# Patient Record
Sex: MALE | Race: WHITE | NOT HISPANIC OR LATINO | URBAN - METROPOLITAN AREA
[De-identification: names, ages, dates, MRNs, and addresses within clinical notes are randomized per-mention and may not be internally consistent; named-entity substitution may affect disease eponyms.]

---

## 2017-03-06 ENCOUNTER — OFFICE VISIT - HEALTHEAST (OUTPATIENT)
Dept: INTERNAL MEDICINE | Facility: CLINIC | Age: 82
End: 2017-03-06

## 2017-03-06 DIAGNOSIS — R27.0 ATAXIA: ICD-10-CM

## 2017-03-06 DIAGNOSIS — E11.9 DIABETES MELLITUS TYPE 2 IN NONOBESE (H): ICD-10-CM

## 2017-03-06 DIAGNOSIS — I10 ESSENTIAL HYPERTENSION WITH GOAL BLOOD PRESSURE LESS THAN 140/90: ICD-10-CM

## 2017-03-06 DIAGNOSIS — C61 PROSTATE CANCER (H): ICD-10-CM

## 2017-03-06 LAB — HBA1C MFR BLD: 5.2 % (ref 3.5–6)

## 2017-03-06 ASSESSMENT — MIFFLIN-ST. JEOR: SCORE: 1237.78

## 2017-03-07 ENCOUNTER — COMMUNICATION - HEALTHEAST (OUTPATIENT)
Dept: INTERNAL MEDICINE | Facility: CLINIC | Age: 82
End: 2017-03-07

## 2017-08-22 ENCOUNTER — COMMUNICATION - HEALTHEAST (OUTPATIENT)
Dept: INTERNAL MEDICINE | Facility: CLINIC | Age: 82
End: 2017-08-22

## 2017-08-22 DIAGNOSIS — I10 ESSENTIAL HYPERTENSION: ICD-10-CM

## 2017-08-24 ENCOUNTER — COMMUNICATION - HEALTHEAST (OUTPATIENT)
Dept: INTERNAL MEDICINE | Facility: CLINIC | Age: 82
End: 2017-08-24

## 2017-08-24 DIAGNOSIS — I10 ESSENTIAL HYPERTENSION: ICD-10-CM

## 2017-08-31 ENCOUNTER — RECORDS - HEALTHEAST (OUTPATIENT)
Dept: ADMINISTRATIVE | Facility: OTHER | Age: 82
End: 2017-08-31

## 2017-09-11 ENCOUNTER — OFFICE VISIT - HEALTHEAST (OUTPATIENT)
Dept: INTERNAL MEDICINE | Facility: CLINIC | Age: 82
End: 2017-09-11

## 2017-09-11 DIAGNOSIS — C61 PROSTATE CANCER (H): ICD-10-CM

## 2017-09-11 DIAGNOSIS — N18.2 CKD (CHRONIC KIDNEY DISEASE) STAGE 2, GFR 60-89 ML/MIN: ICD-10-CM

## 2017-09-11 DIAGNOSIS — Z23 INFLUENZA VACCINATION ADMINISTERED AT CURRENT VISIT: ICD-10-CM

## 2017-09-11 DIAGNOSIS — R27.0 ATAXIA: ICD-10-CM

## 2017-09-11 DIAGNOSIS — R97.20 PSA ELEVATION: ICD-10-CM

## 2017-09-11 DIAGNOSIS — I10 ESSENTIAL HYPERTENSION: ICD-10-CM

## 2017-09-11 LAB — PSA SERPL-MCNC: 34.8 NG/ML (ref 0–6.5)

## 2017-09-11 ASSESSMENT — MIFFLIN-ST. JEOR: SCORE: 1255.93

## 2017-09-12 ENCOUNTER — COMMUNICATION - HEALTHEAST (OUTPATIENT)
Dept: INTERNAL MEDICINE | Facility: CLINIC | Age: 82
End: 2017-09-12

## 2017-12-13 ENCOUNTER — AMBULATORY - HEALTHEAST (OUTPATIENT)
Dept: LAB | Facility: CLINIC | Age: 82
End: 2017-12-13

## 2017-12-13 DIAGNOSIS — C61 PROSTATE CANCER (H): ICD-10-CM

## 2017-12-13 LAB — PSA SERPL-MCNC: 39.5 NG/ML (ref 0–6.5)

## 2017-12-15 ENCOUNTER — COMMUNICATION - HEALTHEAST (OUTPATIENT)
Dept: INTERNAL MEDICINE | Facility: CLINIC | Age: 82
End: 2017-12-15

## 2018-06-05 ENCOUNTER — COMMUNICATION - HEALTHEAST (OUTPATIENT)
Dept: INTERNAL MEDICINE | Facility: CLINIC | Age: 83
End: 2018-06-05

## 2018-06-07 ENCOUNTER — COMMUNICATION - HEALTHEAST (OUTPATIENT)
Dept: INTERNAL MEDICINE | Facility: CLINIC | Age: 83
End: 2018-06-07

## 2018-06-08 ENCOUNTER — OFFICE VISIT - HEALTHEAST (OUTPATIENT)
Dept: INTERNAL MEDICINE | Facility: CLINIC | Age: 83
End: 2018-06-08

## 2018-06-08 DIAGNOSIS — F10.10 ALCOHOL ABUSE: ICD-10-CM

## 2018-06-08 DIAGNOSIS — E11.9 DIABETES MELLITUS TYPE 2 IN NONOBESE (H): ICD-10-CM

## 2018-06-08 DIAGNOSIS — C61 PROSTATE CANCER (H): ICD-10-CM

## 2018-06-08 DIAGNOSIS — Z01.818 PREOPERATIVE EXAMINATION: ICD-10-CM

## 2018-06-08 DIAGNOSIS — R00.1 CHRONIC SINUS BRADYCARDIA: ICD-10-CM

## 2018-06-08 DIAGNOSIS — I25.10 CORONARY STENOSIS: ICD-10-CM

## 2018-06-08 DIAGNOSIS — H26.9 CATARACT OF BOTH EYES, UNSPECIFIED CATARACT TYPE: ICD-10-CM

## 2018-06-08 DIAGNOSIS — I10 ESSENTIAL HYPERTENSION: ICD-10-CM

## 2018-06-08 ASSESSMENT — MIFFLIN-ST. JEOR: SCORE: 1187.89

## 2018-06-11 LAB
CREAT SERPL-MCNC: 2.9 MG/DL (ref 0.7–1.3)
GFR SERPL CREATININE-BSD FRML MDRD: 21 ML/MIN/1.73M2
POTASSIUM BLD-SCNC: 4.5 MMOL/L (ref 3.5–5)

## 2018-06-13 ENCOUNTER — COMMUNICATION - HEALTHEAST (OUTPATIENT)
Dept: INTERNAL MEDICINE | Facility: CLINIC | Age: 83
End: 2018-06-13

## 2018-06-15 ENCOUNTER — COMMUNICATION - HEALTHEAST (OUTPATIENT)
Dept: INTERNAL MEDICINE | Facility: CLINIC | Age: 83
End: 2018-06-15

## 2018-08-08 ENCOUNTER — OFFICE VISIT - HEALTHEAST (OUTPATIENT)
Dept: INTERNAL MEDICINE | Facility: CLINIC | Age: 83
End: 2018-08-08

## 2018-08-08 DIAGNOSIS — E11.9 DIABETES MELLITUS TYPE 2 IN NONOBESE (H): ICD-10-CM

## 2018-08-08 DIAGNOSIS — R97.20 ELEVATED PROSTATE SPECIFIC ANTIGEN (PSA): ICD-10-CM

## 2018-08-08 DIAGNOSIS — C61 PROSTATE CANCER (H): ICD-10-CM

## 2018-08-08 DIAGNOSIS — I10 ESSENTIAL HYPERTENSION: ICD-10-CM

## 2018-08-08 LAB
ALBUMIN SERPL-MCNC: 4 G/DL (ref 3.5–5)
ANION GAP SERPL CALCULATED.3IONS-SCNC: 13 MMOL/L (ref 5–18)
BASOPHILS # BLD AUTO: 0.1 THOU/UL (ref 0–0.2)
BASOPHILS NFR BLD AUTO: 1 % (ref 0–2)
BUN SERPL-MCNC: 43 MG/DL (ref 8–28)
CALCIUM SERPL-MCNC: 9.5 MG/DL (ref 8.5–10.5)
CHLORIDE BLD-SCNC: 110 MMOL/L (ref 98–107)
CO2 SERPL-SCNC: 18 MMOL/L (ref 22–31)
CREAT SERPL-MCNC: 2 MG/DL (ref 0.7–1.3)
EOSINOPHIL # BLD AUTO: 0.3 THOU/UL (ref 0–0.4)
EOSINOPHIL NFR BLD AUTO: 3 % (ref 0–6)
ERYTHROCYTE [DISTWIDTH] IN BLOOD BY AUTOMATED COUNT: 11.9 % (ref 11–14.5)
GFR SERPL CREATININE-BSD FRML MDRD: 32 ML/MIN/1.73M2
GLUCOSE BLD-MCNC: 113 MG/DL (ref 70–125)
HBA1C MFR BLD: 5.7 % (ref 3.5–6)
HCT VFR BLD AUTO: 34 % (ref 40–54)
HGB BLD-MCNC: 11.7 G/DL (ref 14–18)
LYMPHOCYTES # BLD AUTO: 1.7 THOU/UL (ref 0.8–4.4)
LYMPHOCYTES NFR BLD AUTO: 21 % (ref 20–40)
MCH RBC QN AUTO: 33 PG (ref 27–34)
MCHC RBC AUTO-ENTMCNC: 34.4 G/DL (ref 32–36)
MCV RBC AUTO: 96 FL (ref 80–100)
MONOCYTES # BLD AUTO: 1 THOU/UL (ref 0–0.9)
MONOCYTES NFR BLD AUTO: 12 % (ref 2–10)
NEUTROPHILS # BLD AUTO: 5.2 THOU/UL (ref 2–7.7)
NEUTROPHILS NFR BLD AUTO: 63 % (ref 50–70)
PHOSPHATE SERPL-MCNC: 4.2 MG/DL (ref 2.5–4.5)
PLATELET # BLD AUTO: 197 THOU/UL (ref 140–440)
PMV BLD AUTO: 10.5 FL (ref 8.5–12.5)
POTASSIUM BLD-SCNC: 4.8 MMOL/L (ref 3.5–5)
PSA SERPL-MCNC: 38.5 NG/ML (ref 0–6.5)
RBC # BLD AUTO: 3.55 MILL/UL (ref 4.4–6.2)
SODIUM SERPL-SCNC: 141 MMOL/L (ref 136–145)
WBC: 8.2 THOU/UL (ref 4–11)

## 2018-08-08 ASSESSMENT — MIFFLIN-ST. JEOR: SCORE: 1237.78

## 2018-08-09 ENCOUNTER — COMMUNICATION - HEALTHEAST (OUTPATIENT)
Dept: INTERNAL MEDICINE | Facility: CLINIC | Age: 83
End: 2018-08-09

## 2018-09-13 ENCOUNTER — COMMUNICATION - HEALTHEAST (OUTPATIENT)
Dept: INTERNAL MEDICINE | Facility: CLINIC | Age: 83
End: 2018-09-13

## 2018-09-13 DIAGNOSIS — I10 ESSENTIAL HYPERTENSION: ICD-10-CM

## 2018-12-04 ENCOUNTER — COMMUNICATION - HEALTHEAST (OUTPATIENT)
Dept: INTERNAL MEDICINE | Facility: CLINIC | Age: 83
End: 2018-12-04

## 2018-12-04 DIAGNOSIS — I10 ESSENTIAL HYPERTENSION: ICD-10-CM

## 2018-12-07 ENCOUNTER — COMMUNICATION - HEALTHEAST (OUTPATIENT)
Dept: INTERNAL MEDICINE | Facility: CLINIC | Age: 83
End: 2018-12-07

## 2018-12-07 DIAGNOSIS — I10 ESSENTIAL HYPERTENSION: ICD-10-CM

## 2019-01-01 ENCOUNTER — AMBULATORY - HEALTHEAST (OUTPATIENT)
Dept: LAB | Facility: CLINIC | Age: 84
End: 2019-01-01

## 2019-01-01 ENCOUNTER — OFFICE VISIT - HEALTHEAST (OUTPATIENT)
Dept: INTERNAL MEDICINE | Facility: CLINIC | Age: 84
End: 2019-01-01

## 2019-01-01 ENCOUNTER — HOME CARE/HOSPICE - HEALTHEAST (OUTPATIENT)
Dept: HOME HEALTH SERVICES | Facility: HOME HEALTH | Age: 84
End: 2019-01-01

## 2019-01-01 ENCOUNTER — RECORDS - HEALTHEAST (OUTPATIENT)
Dept: INTERNAL MEDICINE | Facility: CLINIC | Age: 84
End: 2019-01-01

## 2019-01-01 ENCOUNTER — COMMUNICATION - HEALTHEAST (OUTPATIENT)
Dept: INTERNAL MEDICINE | Facility: CLINIC | Age: 84
End: 2019-01-01

## 2019-01-01 ENCOUNTER — AMBULATORY - HEALTHEAST (OUTPATIENT)
Dept: INTERNAL MEDICINE | Facility: CLINIC | Age: 84
End: 2019-01-01

## 2019-01-01 DIAGNOSIS — N18.30 CKD (CHRONIC KIDNEY DISEASE) STAGE 3, GFR 30-59 ML/MIN (H): ICD-10-CM

## 2019-01-01 DIAGNOSIS — I10 ESSENTIAL HYPERTENSION: ICD-10-CM

## 2019-01-01 DIAGNOSIS — Z23 FLU VACCINE NEED: ICD-10-CM

## 2019-01-01 DIAGNOSIS — N18.30 CHRONIC KIDNEY DISEASE, STAGE III (MODERATE) (H): ICD-10-CM

## 2019-01-01 DIAGNOSIS — C61 PROSTATE CANCER (H): ICD-10-CM

## 2019-01-01 DIAGNOSIS — N17.9 AKI (ACUTE KIDNEY INJURY) (H): ICD-10-CM

## 2019-01-01 DIAGNOSIS — R60.9 EDEMA, UNSPECIFIED TYPE: ICD-10-CM

## 2019-01-01 DIAGNOSIS — Z86.718 HISTORY OF DVT (DEEP VEIN THROMBOSIS): ICD-10-CM

## 2019-01-01 LAB
ALBUMIN SERPL-MCNC: 3.9 G/DL (ref 3.5–5)
ALBUMIN SERPL-MCNC: 4.4 G/DL (ref 3.5–5)
ANION GAP SERPL CALCULATED.3IONS-SCNC: 10 MMOL/L (ref 5–18)
ANION GAP SERPL CALCULATED.3IONS-SCNC: 14 MMOL/L (ref 5–18)
BUN SERPL-MCNC: 46 MG/DL (ref 8–28)
BUN SERPL-MCNC: 52 MG/DL (ref 8–28)
CALCIUM SERPL-MCNC: 9.3 MG/DL (ref 8.5–10.5)
CALCIUM SERPL-MCNC: 9.4 MG/DL (ref 8.5–10.5)
CHLORIDE BLD-SCNC: 109 MMOL/L (ref 98–107)
CHLORIDE BLD-SCNC: 110 MMOL/L (ref 98–107)
CO2 SERPL-SCNC: 17 MMOL/L (ref 22–31)
CO2 SERPL-SCNC: 20 MMOL/L (ref 22–31)
CREAT SERPL-MCNC: 1.92 MG/DL (ref 0.7–1.3)
CREAT SERPL-MCNC: 2.15 MG/DL (ref 0.7–1.3)
GFR SERPL CREATININE-BSD FRML MDRD: 29 ML/MIN/1.73M2
GFR SERPL CREATININE-BSD FRML MDRD: 33 ML/MIN/1.73M2
GLUCOSE BLD-MCNC: 101 MG/DL (ref 70–125)
GLUCOSE BLD-MCNC: 84 MG/DL (ref 70–125)
PHOSPHATE SERPL-MCNC: 3.4 MG/DL (ref 2.5–4.5)
PHOSPHATE SERPL-MCNC: 4.1 MG/DL (ref 2.5–4.5)
POTASSIUM BLD-SCNC: 4.5 MMOL/L (ref 3.5–5)
POTASSIUM BLD-SCNC: 4.9 MMOL/L (ref 3.5–5)
PSA SERPL-MCNC: 49.1 NG/ML (ref 0–6.5)
SODIUM SERPL-SCNC: 140 MMOL/L (ref 136–145)
SODIUM SERPL-SCNC: 140 MMOL/L (ref 136–145)

## 2019-01-01 ASSESSMENT — MIFFLIN-ST. JEOR
SCORE: 1226.99
SCORE: 1224.54
SCORE: 1242.5

## 2019-03-01 ENCOUNTER — COMMUNICATION - HEALTHEAST (OUTPATIENT)
Dept: INTERNAL MEDICINE | Facility: CLINIC | Age: 84
End: 2019-03-01

## 2019-03-01 DIAGNOSIS — I10 ESSENTIAL HYPERTENSION: ICD-10-CM

## 2019-03-29 ENCOUNTER — HOME CARE/HOSPICE - HEALTHEAST (OUTPATIENT)
Dept: HOME HEALTH SERVICES | Facility: HOME HEALTH | Age: 84
End: 2019-03-29

## 2019-03-30 ENCOUNTER — COMMUNICATION - HEALTHEAST (OUTPATIENT)
Dept: HOME HEALTH SERVICES | Facility: HOME HEALTH | Age: 84
End: 2019-03-30

## 2019-04-01 ENCOUNTER — COMMUNICATION - HEALTHEAST (OUTPATIENT)
Dept: CARE COORDINATION | Facility: CLINIC | Age: 84
End: 2019-04-01

## 2019-04-01 ENCOUNTER — COMMUNICATION - HEALTHEAST (OUTPATIENT)
Dept: INTERNAL MEDICINE | Facility: CLINIC | Age: 84
End: 2019-04-01

## 2019-04-03 ENCOUNTER — AMBULATORY - HEALTHEAST (OUTPATIENT)
Dept: OTHER | Facility: CLINIC | Age: 84
End: 2019-04-03

## 2019-04-05 ENCOUNTER — AMBULATORY - HEALTHEAST (OUTPATIENT)
Dept: INTERNAL MEDICINE | Facility: CLINIC | Age: 84
End: 2019-04-05

## 2019-07-03 ENCOUNTER — OFFICE VISIT - HEALTHEAST (OUTPATIENT)
Dept: INTERNAL MEDICINE | Facility: CLINIC | Age: 84
End: 2019-07-03

## 2019-07-03 DIAGNOSIS — E88.09 HYPOALBUMINEMIA: ICD-10-CM

## 2019-07-03 DIAGNOSIS — R25.2 LEG CRAMPS: ICD-10-CM

## 2019-07-03 DIAGNOSIS — C61 PROSTATE CANCER (H): ICD-10-CM

## 2019-07-03 DIAGNOSIS — I10 ESSENTIAL HYPERTENSION: ICD-10-CM

## 2019-07-03 DIAGNOSIS — D64.9 ANEMIA, UNSPECIFIED TYPE: ICD-10-CM

## 2019-07-03 DIAGNOSIS — N18.9 CHRONIC KIDNEY DISEASE, UNSPECIFIED CKD STAGE: ICD-10-CM

## 2019-07-03 DIAGNOSIS — R97.20 ELEVATED PROSTATE SPECIFIC ANTIGEN (PSA): ICD-10-CM

## 2019-07-03 DIAGNOSIS — R42 DIZZINESS: ICD-10-CM

## 2019-07-03 DIAGNOSIS — R64 CACHEXIA (H): ICD-10-CM

## 2019-07-03 LAB
ALBUMIN SERPL-MCNC: 4.4 G/DL (ref 3.5–5)
ALBUMIN SERPL-MCNC: 4.4 G/DL (ref 3.5–5)
ALP SERPL-CCNC: 90 U/L (ref 45–120)
ALT SERPL W P-5'-P-CCNC: 13 U/L (ref 0–45)
ANION GAP SERPL CALCULATED.3IONS-SCNC: 13 MMOL/L (ref 5–18)
AST SERPL W P-5'-P-CCNC: 19 U/L (ref 0–40)
BASOPHILS # BLD AUTO: 0 THOU/UL (ref 0–0.2)
BASOPHILS NFR BLD AUTO: 0 % (ref 0–2)
BILIRUB DIRECT SERPL-MCNC: 0.1 MG/DL
BILIRUB SERPL-MCNC: 0.3 MG/DL (ref 0–1)
BUN SERPL-MCNC: 60 MG/DL (ref 8–28)
CALCIUM SERPL-MCNC: 10.5 MG/DL (ref 8.5–10.5)
CHLORIDE BLD-SCNC: 109 MMOL/L (ref 98–107)
CO2 SERPL-SCNC: 19 MMOL/L (ref 22–31)
CREAT SERPL-MCNC: 2.35 MG/DL (ref 0.7–1.3)
EOSINOPHIL # BLD AUTO: 0.3 THOU/UL (ref 0–0.4)
EOSINOPHIL NFR BLD AUTO: 3 % (ref 0–6)
ERYTHROCYTE [DISTWIDTH] IN BLOOD BY AUTOMATED COUNT: 12.4 % (ref 11–14.5)
ERYTHROCYTE [SEDIMENTATION RATE] IN BLOOD BY WESTERGREN METHOD: 19 MM/HR (ref 0–15)
GFR SERPL CREATININE-BSD FRML MDRD: 26 ML/MIN/1.73M2
GLUCOSE BLD-MCNC: 105 MG/DL (ref 70–125)
HCT VFR BLD AUTO: 37.1 % (ref 40–54)
HGB BLD-MCNC: 12.4 G/DL (ref 14–18)
LYMPHOCYTES # BLD AUTO: 1.9 THOU/UL (ref 0.8–4.4)
LYMPHOCYTES NFR BLD AUTO: 19 % (ref 20–40)
MCH RBC QN AUTO: 30.6 PG (ref 27–34)
MCHC RBC AUTO-ENTMCNC: 33.5 G/DL (ref 32–36)
MCV RBC AUTO: 91 FL (ref 80–100)
MONOCYTES # BLD AUTO: 0.9 THOU/UL (ref 0–0.9)
MONOCYTES NFR BLD AUTO: 9 % (ref 2–10)
NEUTROPHILS # BLD AUTO: 7.1 THOU/UL (ref 2–7.7)
NEUTROPHILS NFR BLD AUTO: 69 % (ref 50–70)
PHOSPHATE SERPL-MCNC: 3.8 MG/DL (ref 2.5–4.5)
PLATELET # BLD AUTO: 230 THOU/UL (ref 140–440)
PMV BLD AUTO: 8.3 FL (ref 7–10)
POTASSIUM BLD-SCNC: 5.7 MMOL/L (ref 3.5–5)
PROT SERPL-MCNC: 7.6 G/DL (ref 6–8)
PSA SERPL-MCNC: 47.4 NG/ML (ref 0–6.5)
RBC # BLD AUTO: 4.06 MILL/UL (ref 4.4–6.2)
SODIUM SERPL-SCNC: 141 MMOL/L (ref 136–145)
WBC: 10.2 THOU/UL (ref 4–11)

## 2019-07-03 ASSESSMENT — MIFFLIN-ST. JEOR: SCORE: 1217.38

## 2019-07-05 LAB
ALBUMIN PERCENT: 64.4 % (ref 51–67)
ALBUMIN SERPL ELPH-MCNC: 4.6 G/DL (ref 3.2–4.7)
ALPHA 1 PERCENT: 2.9 % (ref 2–4)
ALPHA 2 PERCENT: 12.2 % (ref 5–13)
ALPHA1 GLOB SERPL ELPH-MCNC: 0.2 G/DL (ref 0.1–0.3)
ALPHA2 GLOB SERPL ELPH-MCNC: 0.9 G/DL (ref 0.4–0.9)
B-GLOBULIN SERPL ELPH-MCNC: 0.8 G/DL (ref 0.7–1.2)
BETA PERCENT: 10.6 % (ref 10–17)
GAMMA GLOB SERPL ELPH-MCNC: 0.7 G/DL (ref 0.6–1.4)
GAMMA GLOBULIN PERCENT: 9.9 % (ref 9–20)
PATH ICD:: NORMAL
PROT PATTERN SERPL ELPH-IMP: NORMAL
PROT SERPL-MCNC: 7.1 G/DL (ref 6–8)
REVIEWING PATHOLOGIST: NORMAL

## 2019-07-08 ENCOUNTER — AMBULATORY - HEALTHEAST (OUTPATIENT)
Dept: INTERNAL MEDICINE | Facility: CLINIC | Age: 84
End: 2019-07-08

## 2019-07-08 DIAGNOSIS — N17.9 AKI (ACUTE KIDNEY INJURY) (H): ICD-10-CM

## 2019-07-09 ENCOUNTER — COMMUNICATION - HEALTHEAST (OUTPATIENT)
Dept: INTERNAL MEDICINE | Facility: CLINIC | Age: 84
End: 2019-07-09

## 2020-01-01 ENCOUNTER — COMMUNICATION - HEALTHEAST (OUTPATIENT)
Dept: INTERNAL MEDICINE | Facility: CLINIC | Age: 85
End: 2020-01-01

## 2020-01-01 ENCOUNTER — HOME CARE/HOSPICE - HEALTHEAST (OUTPATIENT)
Dept: HOSPICE | Facility: HOSPICE | Age: 85
End: 2020-01-01

## 2020-01-01 ENCOUNTER — OFFICE VISIT - HEALTHEAST (OUTPATIENT)
Dept: GERIATRICS | Facility: CLINIC | Age: 85
End: 2020-01-01

## 2020-01-01 ENCOUNTER — AMBULATORY - HEALTHEAST (OUTPATIENT)
Dept: GERIATRICS | Facility: CLINIC | Age: 85
End: 2020-01-01

## 2020-01-01 ENCOUNTER — HOSPITAL ENCOUNTER (OUTPATIENT)
Dept: RADIOLOGY | Facility: CLINIC | Age: 85
Discharge: HOME | End: 2020-06-25

## 2020-01-01 ENCOUNTER — AMBULATORY - HEALTHEAST (OUTPATIENT)
Dept: PHARMACY | Facility: CLINIC | Age: 85
End: 2020-01-01

## 2020-01-01 ENCOUNTER — RECORDS - HEALTHEAST (OUTPATIENT)
Dept: LAB | Facility: CLINIC | Age: 85
End: 2020-01-01

## 2020-01-01 ENCOUNTER — OFFICE VISIT - HEALTHEAST (OUTPATIENT)
Dept: INTERNAL MEDICINE | Facility: CLINIC | Age: 85
End: 2020-01-01

## 2020-01-01 ENCOUNTER — DOCUMENTATION ONLY (OUTPATIENT)
Dept: OTHER | Facility: CLINIC | Age: 85
End: 2020-01-01

## 2020-01-01 ENCOUNTER — ANESTHESIA - HEALTHEAST (OUTPATIENT)
Dept: SURGERY | Facility: CLINIC | Age: 85
End: 2020-01-01

## 2020-01-01 ENCOUNTER — AMBULATORY - HEALTHEAST (OUTPATIENT)
Dept: SURGERY | Facility: CLINIC | Age: 85
End: 2020-01-01

## 2020-01-01 ENCOUNTER — HOSPITAL ENCOUNTER (OUTPATIENT)
Dept: RADIOLOGY | Facility: CLINIC | Age: 85
Discharge: HOME | End: 2020-06-17

## 2020-01-01 ENCOUNTER — SURGERY - HEALTHEAST (OUTPATIENT)
Dept: SURGERY | Facility: CLINIC | Age: 85
End: 2020-01-01

## 2020-01-01 ENCOUNTER — RECORDS - HEALTHEAST (OUTPATIENT)
Dept: ADMINISTRATIVE | Facility: OTHER | Age: 85
End: 2020-01-01

## 2020-01-01 ENCOUNTER — COMMUNICATION - HEALTHEAST (OUTPATIENT)
Dept: NURSING | Facility: CLINIC | Age: 85
End: 2020-01-01

## 2020-01-01 ENCOUNTER — AMBULATORY - HEALTHEAST (OUTPATIENT)
Dept: NURSING | Facility: CLINIC | Age: 85
End: 2020-01-01

## 2020-01-01 ENCOUNTER — AMBULATORY - HEALTHEAST (OUTPATIENT)
Dept: OTHER | Facility: CLINIC | Age: 85
End: 2020-01-01

## 2020-01-01 ENCOUNTER — AMBULATORY - HEALTHEAST (OUTPATIENT)
Dept: HOSPICE | Facility: HOSPICE | Age: 85
End: 2020-01-01

## 2020-01-01 DIAGNOSIS — F43.10 PTSD (POST-TRAUMATIC STRESS DISORDER): ICD-10-CM

## 2020-01-01 DIAGNOSIS — N18.30 CHRONIC KIDNEY DISEASE, STAGE III (MODERATE) (H): ICD-10-CM

## 2020-01-01 DIAGNOSIS — N18.4 CKD (CHRONIC KIDNEY DISEASE) STAGE 4, GFR 15-29 ML/MIN (H): ICD-10-CM

## 2020-01-01 DIAGNOSIS — Z71.89 ADVANCED CARE PLANNING/COUNSELING DISCUSSION: ICD-10-CM

## 2020-01-01 DIAGNOSIS — R53.81 PHYSICAL DECONDITIONING: ICD-10-CM

## 2020-01-01 DIAGNOSIS — R13.19 OTHER DYSPHAGIA: ICD-10-CM

## 2020-01-01 DIAGNOSIS — R62.7 ADULT FAILURE TO THRIVE: ICD-10-CM

## 2020-01-01 DIAGNOSIS — R13.10 DYSPHAGIA: ICD-10-CM
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
## 2020-01-01 DIAGNOSIS — Z51.5 HOSPICE CARE: ICD-10-CM

## 2020-01-01 DIAGNOSIS — R46.89 COGNITIVE AND BEHAVIORAL CHANGES: ICD-10-CM

## 2020-01-01 DIAGNOSIS — R00.1 CHRONIC SINUS BRADYCARDIA: ICD-10-CM

## 2020-01-01 DIAGNOSIS — S72.142A CLOSED INTERTROCHANTERIC FRACTURE OF HIP, LEFT, INITIAL ENCOUNTER (H): ICD-10-CM

## 2020-01-01 DIAGNOSIS — E11.9 DIABETES MELLITUS TYPE 2 IN NONOBESE (H): ICD-10-CM

## 2020-01-01 DIAGNOSIS — I10 ESSENTIAL HYPERTENSION: ICD-10-CM

## 2020-01-01 DIAGNOSIS — S72.142A CLOSED COMMINUTED INTERTROCHANTERIC FRACTURE OF LEFT FEMUR, INITIAL ENCOUNTER (H): ICD-10-CM

## 2020-01-01 DIAGNOSIS — I82.4Y2 ACUTE DEEP VEIN THROMBOSIS (DVT) OF PROXIMAL VEIN OF LEFT LOWER EXTREMITY (H): ICD-10-CM

## 2020-01-01 DIAGNOSIS — F22 PARANOIA (H): ICD-10-CM

## 2020-01-01 DIAGNOSIS — R45.1 AGITATION: ICD-10-CM

## 2020-01-01 DIAGNOSIS — S72.002A HIP FRACTURE REQUIRING OPERATIVE REPAIR, LEFT, CLOSED, INITIAL ENCOUNTER (H): ICD-10-CM

## 2020-01-01 DIAGNOSIS — C61 PROSTATE CANCER (H): ICD-10-CM

## 2020-01-01 DIAGNOSIS — K22.89 PRESBYESOPHAGUS: ICD-10-CM

## 2020-01-01 DIAGNOSIS — R52 PAIN: ICD-10-CM

## 2020-01-01 DIAGNOSIS — I25.10 CORONARY STENOSIS: ICD-10-CM

## 2020-01-01 DIAGNOSIS — R41.89 COGNITIVE AND BEHAVIORAL CHANGES: ICD-10-CM

## 2020-01-01 DIAGNOSIS — F10.10 ALCOHOL ABUSE: ICD-10-CM

## 2020-01-01 DIAGNOSIS — R41.0 CONFUSION: ICD-10-CM

## 2020-01-01 DIAGNOSIS — N18.4 ANEMIA DUE TO STAGE 4 CHRONIC KIDNEY DISEASE (H): ICD-10-CM

## 2020-01-01 DIAGNOSIS — Z11.59 ENCOUNTER FOR SCREENING FOR OTHER VIRAL DISEASES: ICD-10-CM

## 2020-01-01 DIAGNOSIS — K21.00 GASTROESOPHAGEAL REFLUX DISEASE WITH ESOPHAGITIS: ICD-10-CM

## 2020-01-01 DIAGNOSIS — I73.9 PAD (PERIPHERAL ARTERY DISEASE) (H): ICD-10-CM

## 2020-01-01 DIAGNOSIS — F43.22 ADJUSTMENT DISORDER WITH ANXIOUS MOOD: ICD-10-CM

## 2020-01-01 DIAGNOSIS — E87.5 HYPERKALEMIA: ICD-10-CM

## 2020-01-01 DIAGNOSIS — R13.10 DYSPHAGIA, UNSPECIFIED TYPE: ICD-10-CM
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
## 2020-01-01 DIAGNOSIS — N17.9 AKI (ACUTE KIDNEY INJURY) (H): ICD-10-CM

## 2020-01-01 DIAGNOSIS — R47.02 DYSPHASIA: ICD-10-CM

## 2020-01-01 DIAGNOSIS — G93.40 ACUTE ENCEPHALOPATHY: ICD-10-CM

## 2020-01-01 DIAGNOSIS — D63.1 ANEMIA DUE TO STAGE 4 CHRONIC KIDNEY DISEASE (H): ICD-10-CM

## 2020-01-01 LAB
ALBUMIN SERPL-MCNC: 2.6 G/DL (ref 3.5–5)
ALBUMIN SERPL-MCNC: 4 G/DL (ref 3.5–5)
ALBUMIN UR-MCNC: ABNORMAL MG/DL
ALBUMIN UR-MCNC: ABNORMAL MG/DL
ALP SERPL-CCNC: 105 U/L (ref 45–120)
ALT SERPL W P-5'-P-CCNC: <9 U/L (ref 0–45)
ANION GAP SERPL CALCULATED.3IONS-SCNC: 10 MMOL/L (ref 5–18)
ANION GAP SERPL CALCULATED.3IONS-SCNC: 10 MMOL/L (ref 5–18)
ANION GAP SERPL CALCULATED.3IONS-SCNC: 11 MMOL/L (ref 5–18)
ANION GAP SERPL CALCULATED.3IONS-SCNC: 13 MMOL/L (ref 5–18)
APPEARANCE UR: CLEAR
APPEARANCE UR: CLEAR
AST SERPL W P-5'-P-CCNC: 14 U/L (ref 0–40)
BACTERIA #/AREA URNS HPF: ABNORMAL HPF
BACTERIA #/AREA URNS HPF: ABNORMAL HPF
BACTERIA SPEC CULT: NO GROWTH
BACTERIA SPEC CULT: NO GROWTH
BILIRUB SERPL-MCNC: 0.7 MG/DL (ref 0–1)
BILIRUB UR QL STRIP: NEGATIVE
BILIRUB UR QL STRIP: NEGATIVE
BUN SERPL-MCNC: 26 MG/DL (ref 8–28)
BUN SERPL-MCNC: 35 MG/DL (ref 8–28)
BUN SERPL-MCNC: 37 MG/DL (ref 8–28)
BUN SERPL-MCNC: 53 MG/DL (ref 8–28)
C REACTIVE PROTEIN LHE: 5.1 MG/DL (ref 0–0.8)
CALCIUM SERPL-MCNC: 8.9 MG/DL (ref 8.5–10.5)
CALCIUM SERPL-MCNC: 9.1 MG/DL (ref 8.5–10.5)
CALCIUM SERPL-MCNC: 9.1 MG/DL (ref 8.5–10.5)
CALCIUM SERPL-MCNC: 9.7 MG/DL (ref 8.5–10.5)
CHLORIDE BLD-SCNC: 108 MMOL/L (ref 98–107)
CHLORIDE BLD-SCNC: 110 MMOL/L (ref 98–107)
CHLORIDE BLD-SCNC: 110 MMOL/L (ref 98–107)
CHLORIDE BLD-SCNC: 111 MMOL/L (ref 98–107)
CO2 SERPL-SCNC: 19 MMOL/L (ref 22–31)
CO2 SERPL-SCNC: 20 MMOL/L (ref 22–31)
CO2 SERPL-SCNC: 22 MMOL/L (ref 22–31)
CO2 SERPL-SCNC: 23 MMOL/L (ref 22–31)
COLOR UR AUTO: YELLOW
COLOR UR AUTO: YELLOW
CREAT SERPL-MCNC: 1.63 MG/DL (ref 0.7–1.3)
CREAT SERPL-MCNC: 1.64 MG/DL (ref 0.7–1.3)
CREAT SERPL-MCNC: 1.9 MG/DL (ref 0.7–1.3)
CREAT SERPL-MCNC: 2.2 MG/DL (ref 0.7–1.3)
ERYTHROCYTE [DISTWIDTH] IN BLOOD BY AUTOMATED COUNT: 14.1 % (ref 11–14.5)
ERYTHROCYTE [DISTWIDTH] IN BLOOD BY AUTOMATED COUNT: 14.3 % (ref 11–14.5)
ERYTHROCYTE [SEDIMENTATION RATE] IN BLOOD BY WESTERGREN METHOD: 68 MM/HR (ref 0–15)
GFR SERPL CREATININE-BSD FRML MDRD: 28 ML/MIN/1.73M2
GFR SERPL CREATININE-BSD FRML MDRD: 34 ML/MIN/1.73M2
GFR SERPL CREATININE-BSD FRML MDRD: 40 ML/MIN/1.73M2
GFR SERPL CREATININE-BSD FRML MDRD: 40 ML/MIN/1.73M2
GLUCOSE BLD-MCNC: 101 MG/DL (ref 70–125)
GLUCOSE BLD-MCNC: 109 MG/DL (ref 70–125)
GLUCOSE BLD-MCNC: 82 MG/DL (ref 70–125)
GLUCOSE BLD-MCNC: 93 MG/DL (ref 70–125)
GLUCOSE UR STRIP-MCNC: NEGATIVE MG/DL
GLUCOSE UR STRIP-MCNC: NEGATIVE MG/DL
HCT VFR BLD AUTO: 27.8 % (ref 40–54)
HCT VFR BLD AUTO: 28.1 % (ref 40–54)
HGB BLD-MCNC: 8.2 G/DL (ref 14–18)
HGB BLD-MCNC: 8.5 G/DL (ref 14–18)
HGB BLD-MCNC: 8.5 G/DL (ref 14–18)
HGB UR QL STRIP: ABNORMAL
HGB UR QL STRIP: NEGATIVE
HYALINE CASTS #/AREA URNS LPF: ABNORMAL LPF
KETONES UR STRIP-MCNC: ABNORMAL MG/DL
KETONES UR STRIP-MCNC: ABNORMAL MG/DL
LEUKOCYTE ESTERASE UR QL STRIP: NEGATIVE
LEUKOCYTE ESTERASE UR QL STRIP: NEGATIVE
MCH RBC QN AUTO: 28.1 PG (ref 27–34)
MCH RBC QN AUTO: 29.1 PG (ref 27–34)
MCHC RBC AUTO-ENTMCNC: 30.2 G/DL (ref 32–36)
MCHC RBC AUTO-ENTMCNC: 30.6 G/DL (ref 32–36)
MCV RBC AUTO: 93 FL (ref 80–100)
MCV RBC AUTO: 95 FL (ref 80–100)
MUCOUS THREADS #/AREA URNS LPF: ABNORMAL LPF
MUCOUS THREADS #/AREA URNS LPF: ABNORMAL LPF
NITRATE UR QL: NEGATIVE
NITRATE UR QL: NEGATIVE
PH UR STRIP: 5.5 [PH] (ref 4.5–8)
PH UR STRIP: 5.5 [PH] (ref 4.5–8)
PHOSPHATE SERPL-MCNC: 3.7 MG/DL (ref 2.5–4.5)
PLATELET # BLD AUTO: 398 THOU/UL (ref 140–440)
PLATELET # BLD AUTO: 411 THOU/UL (ref 140–440)
PMV BLD AUTO: 10.5 FL (ref 8.5–12.5)
PMV BLD AUTO: 10.5 FL (ref 8.5–12.5)
POTASSIUM BLD-SCNC: 3.8 MMOL/L (ref 3.5–5)
POTASSIUM BLD-SCNC: 4.2 MMOL/L (ref 3.5–5)
POTASSIUM BLD-SCNC: 4.5 MMOL/L (ref 3.5–5)
POTASSIUM BLD-SCNC: 5.2 MMOL/L (ref 3.5–5)
PROCALCITONIN SERPL-MCNC: 0.08 NG/ML (ref 0–0.49)
PROT SERPL-MCNC: 6.3 G/DL (ref 6–8)
RBC # BLD AUTO: 2.92 MILL/UL (ref 4.4–6.2)
RBC # BLD AUTO: 3.02 MILL/UL (ref 4.4–6.2)
RBC #/AREA URNS AUTO: ABNORMAL HPF
RBC #/AREA URNS AUTO: ABNORMAL HPF
SODIUM SERPL-SCNC: 140 MMOL/L (ref 136–145)
SODIUM SERPL-SCNC: 141 MMOL/L (ref 136–145)
SODIUM SERPL-SCNC: 142 MMOL/L (ref 136–145)
SODIUM SERPL-SCNC: 144 MMOL/L (ref 136–145)
SP GR UR STRIP: 1.01 (ref 1–1.03)
SP GR UR STRIP: 1.01 (ref 1–1.03)
SQUAMOUS #/AREA URNS AUTO: ABNORMAL LPF
SQUAMOUS #/AREA URNS AUTO: ABNORMAL LPF
UROBILINOGEN UR STRIP-ACNC: ABNORMAL
UROBILINOGEN UR STRIP-ACNC: ABNORMAL
WBC #/AREA URNS AUTO: ABNORMAL HPF
WBC #/AREA URNS AUTO: ABNORMAL HPF
WBC: 11.2 THOU/UL (ref 4–11)
WBC: 8.7 THOU/UL (ref 4–11)

## 2020-01-01 RX ORDER — LORAZEPAM 2 MG/ML
0.5 CONCENTRATE ORAL EVERY 8 HOURS
Status: SHIPPED | COMMUNITY
Start: 2020-01-01

## 2020-01-01 RX ORDER — ATROPINE SULFATE 10 MG/ML
2 SOLUTION/ DROPS OPHTHALMIC EVERY 4 HOURS PRN
Status: SHIPPED | COMMUNITY
Start: 2020-01-01

## 2020-01-01 RX ORDER — HYDROMORPHONE HYDROCHLORIDE 1 MG/ML
1 SOLUTION ORAL
Status: SHIPPED | COMMUNITY
Start: 2020-01-01

## 2020-01-01 RX ORDER — HYDROMORPHONE HYDROCHLORIDE 1 MG/ML
1 SOLUTION ORAL EVERY 8 HOURS
Status: SHIPPED | COMMUNITY
Start: 2020-01-01

## 2020-01-01 RX ORDER — LORAZEPAM 2 MG/ML
0.5 CONCENTRATE ORAL
Status: SHIPPED | COMMUNITY
Start: 2020-01-01

## 2020-01-01 RX ORDER — BISACODYL 10 MG
10 SUPPOSITORY, RECTAL RECTAL SEE ADMIN INSTRUCTIONS
Status: SHIPPED | COMMUNITY
Start: 2020-01-01

## 2020-01-01 ASSESSMENT — MIFFLIN-ST. JEOR
SCORE: 1176.56
SCORE: 1281.34
SCORE: 1240.06
SCORE: 1250.49
SCORE: 1176.56
SCORE: 1250.49
SCORE: 1281.34

## 2020-01-01 ASSESSMENT — PATIENT HEALTH QUESTIONNAIRE - PHQ9: SUM OF ALL RESPONSES TO PHQ QUESTIONS 1-9: 0

## 2020-07-13 ENCOUNTER — HOME CARE/HOSPICE - HEALTHEAST (OUTPATIENT)
Dept: HOSPICE | Facility: HOSPICE | Age: 85
End: 2020-07-13

## 2020-07-13 ENCOUNTER — AMBULATORY - HEALTHEAST (OUTPATIENT)
Dept: GERIATRICS | Facility: CLINIC | Age: 85
End: 2020-07-13

## 2020-07-14 ENCOUNTER — HOME CARE/HOSPICE - HEALTHEAST (OUTPATIENT)
Dept: HOSPICE | Facility: HOSPICE | Age: 85
End: 2020-07-14

## 2020-07-15 ENCOUNTER — HOME CARE/HOSPICE - HEALTHEAST (OUTPATIENT)
Dept: HOSPICE | Facility: HOSPICE | Age: 85
End: 2020-07-15

## 2020-07-23 ENCOUNTER — RECORDS - HEALTHEAST (OUTPATIENT)
Dept: ADMINISTRATIVE | Facility: OTHER | Age: 85
End: 2020-07-23

## 2021-05-25 ENCOUNTER — RECORDS - HEALTHEAST (OUTPATIENT)
Dept: ADMINISTRATIVE | Facility: CLINIC | Age: 86
End: 2021-05-25

## 2021-05-26 VITALS
RESPIRATION RATE: 22 BRPM | HEART RATE: 101 BPM | DIASTOLIC BLOOD PRESSURE: 72 MMHG | SYSTOLIC BLOOD PRESSURE: 140 MMHG | OXYGEN SATURATION: 96 %

## 2021-05-27 VITALS
OXYGEN SATURATION: 99 % | RESPIRATION RATE: 28 BRPM | TEMPERATURE: 96.6 F | DIASTOLIC BLOOD PRESSURE: 113 MMHG | HEART RATE: 84 BPM | SYSTOLIC BLOOD PRESSURE: 159 MMHG

## 2021-05-27 VITALS
RESPIRATION RATE: 24 BRPM | HEART RATE: 88 BPM | OXYGEN SATURATION: 97 % | SYSTOLIC BLOOD PRESSURE: 84 MMHG | DIASTOLIC BLOOD PRESSURE: 52 MMHG

## 2021-05-27 ASSESSMENT — PATIENT HEALTH QUESTIONNAIRE - PHQ9: SUM OF ALL RESPONSES TO PHQ QUESTIONS 1-9: 0

## 2021-05-27 NOTE — PROGRESS NOTES
Patient's wife .  Called offer condolences and see whether I could help    FYI - Status Update  Who is Calling: Patient  Update: Patient called 19 and cancelled the scheduled INF appointment.  Patient states he does not want to come for an appointment.  Patient states he wants to be left alone for at least 90 days and he is an adult and does not need to be told what to do.  Patient repeated these statements a couple of time and requested the appointment be cancelled, which writer complied with.    Okay to leave a detailed message?:  No

## 2021-05-27 NOTE — TELEPHONE ENCOUNTER
Dr. London,    Home care had received a referral to see patient after his hospitalization.  In setting up the visit he states that he is doing well and is driving. Referral will be discarded. He does meet homebound status and was instructed to follow up with you as needed.    Thank you, Meseret Morales RN

## 2021-05-27 NOTE — TELEPHONE ENCOUNTER
FYI - Status Update  Who is Calling: Patient  Update: Patient called 4/1/19 and cancelled the scheduled INF appointment.  Patient states he does not want to come for an appointment.  Patient states he wants to be left alone for at least 90 days and he is an adult and does not need to be told what to do.  Patient repeated these statements a couple of time and requested the appointment be cancelled, which writer complied with.    Okay to leave a detailed message?:  No

## 2021-05-27 NOTE — PROGRESS NOTES
Hospital discharge follow up call to pt, no answer. Left VM message reminder about today's appt with Dr Palomo. RN unable to make second call before this morning's appt.

## 2021-05-30 VITALS — WEIGHT: 133 LBS | BODY MASS INDEX: 20.16 KG/M2 | HEIGHT: 68 IN

## 2021-05-30 NOTE — PROGRESS NOTES
OFFICE VISIT NOTE  Ortiz Ely   88 y.o. male            Assessment/Plan for  Ortiz Ely is a 88 y.o. male.  No Patient Care Coordination Note on file.       1. Essential hypertension  With symptomatic dizziness leg cramps and good control and relative inactivity I will discontinue his hydrochlorothiazide.  He will do stretching exercises prior to going to bed.  He will stay on the losartan  - losartan (COZAAR) 50 MG tablet; Take 1 tablet (50 mg total) by mouth daily.  Dispense: 90 tablet; Refill: 3    2. Weight loss  He is thin.  He has low albumin.  According to the record he was drinking in February.  He denies that.  He says he is not drinking but rarely at this time.  His weight is actually stable over the last year.      3. Grief reaction-wife .  Estranged son .     4.  Elevated PSA with normal CT-reviewed.  He had cryotherapy.  He is not having any urinary issues.  I will recheck his PSA.  It was elevated at 49.    5.  Chronic anemia recheck  6.  Chronic kidney disease.  Did sustain acute February -discontinue hydrochlorothiazide.  Maintain Cozaar.    Plan:  Dc hctz  Ensure daily  Return visit 6 weeks  Laboratory    There are no Patient Instructions on file for this visit.    Diagnoses and all orders for this visit:    Essential hypertension  -     losartan (COZAAR) 50 MG tablet; Take 1 tablet (50 mg total) by mouth daily.  Dispense: 90 tablet; Refill: 3    Prostate cancer (H)    Anemia, unspecified type    Elevated prostate specific antigen (PSA)  -     PSA (Prostatic-Specific Antigen), Diagnostic    Cachexia (H)    Chronic kidney disease, unspecified CKD stage  -     HM1(CBC and Differential)  -     Renal Function Profile  -     Hepatic Profile  -     HM1 (CBC with Diff)    Dizziness    Leg cramps    Hypoalbuminemia  -     Electrophoresis, Protein, Serum  -     Erythrocyte Sedimentation Rate; Future; Expected date: 2019  -     Erythrocyte Sedimentation Rate    Other orders  -     Cancel:  hydroCHLOROthiazide (HYDRODIURIL) 25 MG tablet; Take 1 tablet (25 mg total) by mouth daily.  Dispense: 90 tablet; Refill: 1        Medications after visit  Current Outpatient Medications   Medication Sig Dispense Refill     aspirin 81 MG EC tablet Take 81 mg by mouth daily.       multivitamin with minerals (THERA-M) 9 mg iron-400 mcg Tab tablet Take 1 tablet by mouth daily.       losartan (COZAAR) 50 MG tablet Take 1 tablet (50 mg total) by mouth daily. 90 tablet 3     No current facility-administered medications for this visit.              This provider spent greater than 40 min. face-to-face time with the patient and/or his family.  More than half this time was spent in counseling and or coordination of care which was consistent with the nature of this patient's problems which are listed and described in the assessment and plan.        Preston London MD  Internal medicine  [facility]  [phone]  [email or web address]    Much or all of the text in this note was generated through the use of Dragon Dictate voice-to-text software. Errors in spelling or words which seem out of context are unintentional.   Sound alike errors, in particular, may have escaped editing.                 Subjective:   Chief Complaint:  Follow-up (med check-pt states that he is having restless legs at night, it will wake him up, he has to get up and walk around for a little bit to get them to calm down)    Patient in for follow-up  Wife Ruma   Estranged son Je   He has been emotional  He was in the hospital during her illness  Alcohol was recorded  There was a fall  He notes he is not drinking    He notes he is eating  He notes he takes an Ensure  He says his weight has been stable which it is documented as stable over the last  Urination okay  Bowel okay    Hypertension-There are no cardiovascular, respiratory, neurologic complaints.No claudication.There is some orthostasis.Patient is  compliant with medications.  Medications reviewed.   No side effects from medication.  He has some calf cramps at night     review of Systems:     Extensive 10-point review of systems was performed. Please see the HPI for problem specific pertinent review of systems.     Patient does note denies pain    Otherwise, the following systems are noncontributory including constitutional, eyes, ears, nose and throat, cardiovascular, respiratory, gastrointestinal, genitourinary, musculoskeletal,neurological, skin and/or breast, endocrine, hematologic/lymph, allergic/immunologic and psychiatric.              Medications:  Current Outpatient Medications on File Prior to Visit   Medication Sig     aspirin 81 MG EC tablet Take 81 mg by mouth daily.     multivitamin with minerals (THERA-M) 9 mg iron-400 mcg Tab tablet Take 1 tablet by mouth daily.     [DISCONTINUED] hydroCHLOROthiazide (HYDRODIURIL) 25 MG tablet TAKE 1 TABLET BY MOUTH  DAILY     [DISCONTINUED] losartan (COZAAR) 50 MG tablet Take 1 tablet (50 mg total) by mouth daily.     No current facility-administered medications on file prior to visit.             Allergies:  Allergies   Allergen Reactions     Cardizem [Diltiazem Hcl] Unknown     Dyazide [Triamterene-Hydrochlorothiazid] Unknown     Lopressor [Metoprolol Tartrate] Unknown     Penicillins Hives     Sulfa (Sulfonamide Antibiotics) Hives     Vasotec [Enalapril Maleate] Cough     Cardura [Doxazosin] Palpitations       PSFHx: Tobacco Status:  He  reports that he quit smoking about 54 years ago. He has a 15.00 pack-year smoking history. He has never used smokeless tobacco.   Alcohol Status:    Social History     Substance and Sexual Activity   Alcohol Use Yes    Comment: 2-4 drinks a day (he won't say for sure)       reports that he quit smoking about 54 years ago. He has a 15.00 pack-year smoking history. He has never used smokeless tobacco. He reports that he drinks alcohol. He reports that he does not use  "drugs.    Objective:    /78   Pulse 70   Ht 5' 7\" (1.702 m)   Wt 132 lb (59.9 kg)   SpO2 100% Comment: RA  BMI 20.67 kg/m    Weight:   Wt Readings from Last 3 Encounters:   07/03/19 132 lb (59.9 kg)   03/29/19 127 lb 8 oz (57.8 kg)   08/08/18 133 lb (60.3 kg)     BP Readings from Last 10 Encounters:   07/03/19 138/78   03/29/19 145/70   08/08/18 126/70   06/08/18 134/60   09/11/17 138/82   03/06/17 138/68   01/08/17 (!) 198/106   10/10/16 130/80   05/23/16 (!) 190/86   02/27/16 156/68         General-appears well, no acute distress.  Neck no adenopathy  Chest clear rhonchi at base  Cardiac-no murmur     Abdomen-soft.  Normal.  No mass.  No hepatosplenomegaly  No edema    Reviewed CT scan  Reviewed lab from hospital  Reviewed discharge summary  Noted elevated PSA            Review of clinical lab tests  Lab Results   Component Value Date    WBC 10.2 07/03/2019    HGB 12.4 (L) 07/03/2019    HCT 37.1 (L) 07/03/2019     07/03/2019    ALT 12 03/27/2019    AST 16 03/27/2019     03/29/2019    K 3.7 03/29/2019     (H) 03/29/2019    CREATININE 1.75 (H) 03/29/2019    BUN 44 (H) 03/29/2019    CO2 17 (L) 03/29/2019    TSH 2.06 03/27/2019    PSA 40.3 (H) 03/27/2019    INR 1.04 03/26/2019    HGBA1C 5.9 03/27/2019      Lab Results   Component Value Date    ALT 12 03/27/2019    AST 16 03/27/2019    ALKPHOS 58 03/27/2019    BILITOT 0.3 03/27/2019     Lab Results   Component Value Date    ALBUMIN 2.9 (L) 03/27/2019     BUN   Date/Time Value Ref Range Status   03/29/2019 06:47 AM 44 (H) 8 - 28 mg/dL Final   03/28/2019 05:35 AM 65 (H) 8 - 28 mg/dL Final   03/27/2019 05:18 AM 92 (H) 8 - 28 mg/dL Final   03/26/2019 04:22  (H) 8 - 28 mg/dL Final   08/08/2018 03:52 PM 43 (H) 8 - 28 mg/dL Final   09/11/2017 02:26 PM 30 (H) 8 - 28 mg/dL Final   03/06/2017 01:37 PM 35 (H) 8 - 28 mg/dL Final   10/10/2016 03:01 PM 28 8 - 28 mg/dL Final   05/23/2016 03:02 PM 29 (H) 8 - 28 mg/dL Final   02/25/2016 11:59 PM 11 8 " - 28 mg/dL Final   12/22/2013 06:55 PM 34 (H) 8 - 28 mg/dL Final   05/19/2011 10:40 PM 23 8 - 28 mg/dL Final       Hemoglobin   Date/Time Value Ref Range Status   07/03/2019 02:09 PM 12.4 (L) 14.0 - 18.0 g/dL Final   03/27/2019 05:18 AM 10.5 (L) 14.0 - 18.0 g/dL Final   03/26/2019 03:39 PM 12.9 (L) 14.0 - 18.0 g/dL Final   08/08/2018 03:52 PM 11.7 (L) 14.0 - 18.0 g/dL Final   09/11/2017 02:26 PM 12.4 (L) 14.0 - 18.0 g/dL Final   03/06/2017 01:37 PM 14.8 14.0 - 18.0 g/dL Final   10/10/2016 03:01 PM 13.5 (L) 14.0 - 18.0 g/dL Final   05/23/2016 03:02 PM 13.7 (L) 14.0 - 18.0 g/dL Final   02/25/2016 11:59 PM 13.2 (L) 14.0 - 18.0 g/dL Final         Glucose   Date/Time Value Ref Range Status   03/29/2019 06:47  70 - 125 mg/dL Final   03/28/2019 05:35 AM 90 70 - 125 mg/dL Final   03/27/2019 05:18 AM 92 70 - 125 mg/dL Final   03/26/2019 04:22  70 - 125 mg/dL Final   08/08/2018 03:52  70 - 125 mg/dL Final   09/11/2017 02:26  70 - 125 mg/dL Final   03/06/2017 01:37 PM 95 70 - 125 mg/dL Final   10/10/2016 03:01 PM 93 74 - 125 mg/dL Final   05/23/2016 03:02  74 - 125 mg/dL Final   02/25/2016 11:59  70 - 125 mg/dL Final     Recent Results (from the past 24 hour(s))   HM1 (CBC with Diff)   Result Value Ref Range    WBC 10.2 4.0 - 11.0 thou/uL    RBC 4.06 (L) 4.40 - 6.20 mill/uL    Hemoglobin 12.4 (L) 14.0 - 18.0 g/dL    Hematocrit 37.1 (L) 40.0 - 54.0 %    MCV 91 80 - 100 fL    MCH 30.6 27.0 - 34.0 pg    MCHC 33.5 32.0 - 36.0 g/dL    RDW 12.4 11.0 - 14.5 %    Platelets 230 140 - 440 thou/uL    MPV 8.3 7.0 - 10.0 fL    Neutrophils % 69 50 - 70 %    Lymphocytes % 19 (L) 20 - 40 %    Monocytes % 9 2 - 10 %    Eosinophils % 3 0 - 6 %    Basophils % 0 0 - 2 %    Neutrophils Absolute 7.1 2.0 - 7.7 thou/uL    Lymphocytes Absolute 1.9 0.8 - 4.4 thou/uL    Monocytes Absolute 0.9 0.0 - 0.9 thou/uL    Eosinophils Absolute 0.3 0.0 - 0.4 thou/uL    Basophils Absolute 0.0 0.0 - 0.2 thou/uL   Erythrocyte  Sedimentation Rate   Result Value Ref Range    Sed Rate 19 (H) 0 - 15 mm/hr       RADIOLOGY: No results found.    Review of recent consultation-     DATE/TIME: 3/26/2019 6:22 PM     INDICATION: Neoplasm: abdomen, metastatic, suspected. SOB and abd pain. History of prostate cancer.   COMPARISON: Chest radiograph dated 05/23/2016   TECHNIQUE: Helical thin-section CT scan of the chest, abdomen, and pelvis was performed without IV contrast. Multiplanar reformats were obtained. Dose reduction techniques were used.   CONTRAST: None.     FINDINGS:   CHEST: There is diffuse bronchiectasis. There is linear scarring in the right upper lung. There is basilar scarring on the right and left.  No pulmonary nodules identified. The heart is normal in size. There is a small hiatal hernia. No thoracic   lymphadenopathy by size. Air. There is advanced atherosclerotic disease. There is coronary artery calcification. Right hilar lymph nodes are partially calcified.     ABDOMEN: Normal spleen, pancreas, adrenal glands, and liver. There are granulomata location is in the liver. There is cholelithiasis. There are metallic high attenuation foci in the right abdomen. There is a small nonobstructing calculus in the lower   pole of the right kidney measuring 2 mm. Normal kidneys and ureters. 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 Normal stomach. Normal caliber of the small bowel.      PELVIS: Normal urinary bladder. The prostate gland is not well assessed. Seminal vesicles are symmetric. There is colonic diverticulosis. There is mild stranding adjacent to the descending colon and proximal sigmoid colon. The appendix is not identified.        MUSCULOSKELETAL: There is osteopenia. Degenerative changes are present. Healed right rib fractures are noted. There is superior endplate compression of the L2 vertebral body.      IMPRESSION:   CONCLUSION:  1.  Stranding adjacent to the descending and sigmoid colon suggests an infectious or  inflammatory colitis. Diverticulitis is possible. No perforation or abscess.  2.  No pulmonary nodules. No lymphadenopathy in the chest, abdomen, or pelvis.  3.  Scarring in the lung bases likely related to chronic aspiration given the small hiatal hernia.  4.  Advanced atherosclerotic disease including coronary artery calcification.         Imaging

## 2021-05-30 NOTE — TELEPHONE ENCOUNTER
RN cannot approve Refill Request    RN can NOT refill this medication medication not on med list. Last office visit: 7/3/2019 Preston London MD Last Physical: Visit date not found Last MTM visit: Visit date not found Last visit same specialty: 7/3/2019 Preston London MD.  Next visit within 3 mo: Visit date not found  Next physical within 3 mo: Visit date not found      Trisha Live, [facility] Triage/Med Refill 7/28/2019    Requested Prescriptions   Pending Prescriptions Disp Refills     hydroCHLOROthiazide (HYDRODIURIL) 25 MG tablet [Pharmacy Med Name: HYDROCHLOROTHIAZIDE  25MG  TAB] 90 tablet      Sig: TAKE 1 TABLET BY MOUTH  DAILY       Diuretics/Combination Diuretics Refill Protocol  Passed - 7/28/2019  3:20 AM        Passed - Visit with PCP or prescribing provider visit in past 12 months     Last office visit with prescriber/PCP: 7/3/2019 Preston London MD OR same dept: 7/3/2019 Preston London MD OR same specialty: 7/3/2019 Preston London MD  Last physical: Visit date not found Last MTM visit: Visit date not found   Next visit within 3 mo: Visit date not found  Next physical within 3 mo: Visit date not found  Prescriber OR PCP: Preston London MD  Last diagnosis associated with med order: There are no diagnoses linked to this encounter.  If protocol passes may refill for 12 months if within 3 months of last provider visit (or a total of 15 months).             Passed - Serum Potassium in past 12 months      Lab Results   Component Value Date    Potassium 4.5 07/16/2019             Passed - Serum Sodium in past 12 months      Lab Results   Component Value Date    Sodium 140 07/16/2019             Passed - Blood pressure on file in past 12 months     BP Readings from Last 1 Encounters:   07/03/19 138/78             Passed - Serum Creatinine in past 12 months      Creatinine   Date Value Ref Range Status   07/16/2019 1.92 (H) 0.70 - 1.30 mg/dL Final

## 2021-05-30 NOTE — TELEPHONE ENCOUNTER
LMTCB - please relay results/recommendations below per PCP, thank you.     Next appt is currently scheduled for 8/16/19

## 2021-05-30 NOTE — TELEPHONE ENCOUNTER
Med d/c'd on 7/3/19 at [facility]    1. Essential hypertension  With symptomatic dizziness leg cramps and good control and relative inactivity I will discontinue his hydrochlorothiazide.  He will do stretching exercises prior to going to bed.  He will stay on the losartan  - losartan (COZAAR) 50 MG tablet; Take 1 tablet (50 mg total) by mouth daily.  Dispense: 90 tablet; Refill: 3

## 2021-05-30 NOTE — PROGRESS NOTES
Call patient    Dc losartan as well as hctz    Lab check one week as jefferyuj was high    See me as scheduled    Preston London MD  Internal medicine  [facility]  [phone]  [email or web address]

## 2021-05-30 NOTE — TELEPHONE ENCOUNTER
Patient Returning Call  Reason for call:  Patient is returning call  Information relayed to patient:  Message from Preston London MD below regarding lab results and medication changes as well as the need to have lab work repeated in one week relayed to the patient.  Patient has additional questions:  No  If YES, what are your questions/concerns:  Patient was transferred to scheduling to set up a lab only appointment.  Okay to leave a detailed message?: No call back needed

## 2021-05-30 NOTE — TELEPHONE ENCOUNTER
----- Message from Preston London MD sent at 7/8/2019  5:08 PM CDT -----  Call patient    Dc losartan as well as hctz    Lab check one week as landon was high    See me as scheduled    Preston London MD  Internal medicine  [facility]  [phone]  [email or web address]

## 2021-05-31 ENCOUNTER — RECORDS - HEALTHEAST (OUTPATIENT)
Dept: ADMINISTRATIVE | Facility: CLINIC | Age: 86
End: 2021-05-31

## 2021-05-31 VITALS — BODY MASS INDEX: 20.76 KG/M2 | HEIGHT: 68 IN | WEIGHT: 137 LBS

## 2021-05-31 NOTE — PROGRESS NOTES
OFFICE VISIT NOTE            Assessment/Plan for  Ortiz Ely is a 88 y.o. male.  No Patient Care Coordination Note on file.       1.  Severe elevation in blood pressure off medication asymptomatic.  No alcohol involved.  Consider renal artery stenosis.  Will restart losartan 50 mg daily  2.  Ataxia marker  3.  Chronic kidney disease stable     Plan:  Losartan 50  Blood pressure check 6 weeks      There are no diagnoses linked to this encounter.    Preston London MD  Internal medicine  [facility]  [phone]  [email or web address]    This is an electronically verified report by Preston London M.D.  (Note created with Dragon voice recognition and unintended spelling errors and word substitutions may occur)             Subjective:   Chief Complaint:  Hypertension and Follow-up (Routine visit)    Patient stopped all his BP meds  He feels better  He is eating well  He has no CNS symptomatology whatsoever    He is using his walker.  He is not drinking.  He has not had a fall.  He has no edema PND orthopnea or dyspnea    Medications:  Current Outpatient Medications on File Prior to Visit   Medication Sig     aspirin 81 MG EC tablet Take 81 mg by mouth daily.     multivitamin with minerals (THERA-M) 9 mg iron-400 mcg Tab tablet Take 1 tablet by mouth daily.     No current facility-administered medications on file prior to visit.      Allergies:  Allergies   Allergen Reactions     Cardizem [Diltiazem Hcl] Unknown     Dyazide [Triamterene-Hydrochlorothiazid] Unknown     Lopressor [Metoprolol Tartrate] Unknown     Penicillins Hives     Sulfa (Sulfonamide Antibiotics) Hives     Vasotec [Enalapril Maleate] Cough     Cardura [Doxazosin] Palpitations       Review of Systems:     Extensive 10-point review of systems was performed. Please see the HPI for problem specific pertinent review of systems.     Patient does note        Otherwise, the following systems are noncontributory including  "constitutional, eyes, ears, nose and throat, cardiovascular, respiratory, gastrointestinal, genitourinary, musculoskeletal,neurological, skin and/or breast, endocrine, hematologic/lymph, allergic/immunologic and psychiatric.      Objective:    /80 (Patient Position: Standing)   Pulse 73   Ht 5' 7\" (1.702 m)   Wt 134 lb 1.9 oz (60.8 kg)   SpO2 97%   BMI 21.01 kg/m    Weight:   Wt Readings from Last 3 Encounters:   08/14/19 134 lb 1.9 oz (60.8 kg)   07/03/19 132 lb (59.9 kg)   03/29/19 127 lb 8 oz (57.8 kg)     [unfilled]  134 lb 1.9 oz (60.8 kg)    In general, no acute distress.  Looks more rested.  Color good  Vitals:    08/14/19 1422 08/14/19 1424 08/14/19 1502 08/14/19 1503   BP: (!) 234/86 (!) 230/90 (!) 220/80 180/80   Patient Site: Left Arm Left Arm     Patient Position: Sitting Sitting  Standing   Cuff Size: Adult Regular Adult Regular     Pulse: 73      SpO2: 97%      Weight: 134 lb 1.9 oz (60.8 kg)      Height: 5' 7\" (1.702 m)        Chest clear  Pulse regular  No murmur  No edema  Increased 2 pounds        Review of clinical lab tests  Lab Results   Component Value Date    WBC 10.2 07/03/2019    HGB 12.4 (L) 07/03/2019    HCT 37.1 (L) 07/03/2019     07/03/2019    ALT 13 07/03/2019    AST 19 07/03/2019     07/16/2019    K 4.5 07/16/2019     (H) 07/16/2019    CREATININE 1.92 (H) 07/16/2019    BUN 46 (H) 07/16/2019    CO2 20 (L) 07/16/2019    TSH 2.06 03/27/2019    PSA 47.4 (H) 07/03/2019    INR 1.04 03/26/2019    HGBA1C 5.9 03/27/2019   gfr  40-stable    Glucose   Date/Time Value Ref Range Status   07/16/2019 01:08 PM 84 70 - 125 mg/dL Final   07/03/2019 02:09  70 - 125 mg/dL Final   03/29/2019 06:47  70 - 125 mg/dL Final   03/28/2019 05:35 AM 90 70 - 125 mg/dL Final   03/27/2019 05:18 AM 92 70 - 125 mg/dL Final   03/26/2019 04:22  70 - 125 mg/dL Final   08/08/2018 03:52  70 - 125 mg/dL Final   09/11/2017 02:26  70 - 125 mg/dL Final   03/06/2017 " 01:37 PM 95 70 - 125 mg/dL Final   10/10/2016 03:01 PM 93 74 - 125 mg/dL Final     No results found for this or any previous visit (from the past 24 hour(s)).    No results found.

## 2021-06-01 VITALS — HEIGHT: 68 IN | BODY MASS INDEX: 20.16 KG/M2 | WEIGHT: 133 LBS

## 2021-06-01 VITALS — HEIGHT: 68 IN | BODY MASS INDEX: 18.49 KG/M2 | WEIGHT: 122 LBS
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
## 2021-06-02 ENCOUNTER — RECORDS - HEALTHEAST (OUTPATIENT)
Dept: ADMINISTRATIVE | Facility: CLINIC | Age: 86
End: 2021-06-02

## 2021-06-03 VITALS
SYSTOLIC BLOOD PRESSURE: 160 MMHG | HEART RATE: 76 BPM | HEIGHT: 66 IN | WEIGHT: 141.04 LBS | OXYGEN SATURATION: 99 % | DIASTOLIC BLOOD PRESSURE: 80 MMHG | BODY MASS INDEX: 22.67 KG/M2

## 2021-06-03 VITALS — BODY MASS INDEX: 21.05 KG/M2 | WEIGHT: 134.12 LBS | HEIGHT: 67 IN

## 2021-06-03 VITALS — BODY MASS INDEX: 20.72 KG/M2 | HEIGHT: 67 IN | WEIGHT: 132 LBS

## 2021-06-03 NOTE — TELEPHONE ENCOUNTER
Spoke with the patient and relayed message below from Dr. London.  Patient verbalized understanding and has scheduled a lab only appointment on 12/27/19 as requested below from Dr. London.  He had no further questions at this time.  Maria Del Carmen MARIN CMA/HAFSA....................9:47 AM

## 2021-06-03 NOTE — TELEPHONE ENCOUNTER
----- Message from Preston London MD sent at 11/15/2019 12:57 PM CST -----  Call patient  Renal function a bit off  I would like to recheck labs in 6 weeks  Make sure you are drinking plenty of water  Do not take any Advil or Aleve

## 2021-06-03 NOTE — PROGRESS NOTES
OFFICE VISIT NOTE  Ortiz Ely   89 y.o. male            Assessment/Plan for  Ortiz Ely is a 89 y.o. male.  No Patient Care Coordination Note on file.     All in all here he looks better.  He is taking his medicine.  He is not drinking alcohol.          1. Essential hypertension  Controlled with current Rx and edema resolved  - losartan-hydrochlorothiazide (HYZAAR) 100-12.5 mg per tablet; Take 1 tablet by mouth daily.  Dispense: 90 tablet; Refill: 3  - amLODIPine (NORVASC) 5 MG tablet; Take 1 tablet (5 mg total) by mouth daily.  Dispense: 90 tablet; Refill: 3    2. Chronic kidney disease, stage III (moderate) (H)  Stable  - Renal Function Profile    3. Edema, unspecified type  Resolved    4. Prostate cancer (H)  With elevated PSA.  Asymptomatic in terms of bone pain  Original cryosurgery 2004            Plan:  [facility]  Renal  Med refill  Clinic visit 3 months  Notify me any bone pain  Consider antiandrogen therapy    There are no Patient Instructions on file for this visit.    Diagnoses and all orders for this visit:    Chronic kidney disease, stage III (moderate) (H)  -     Renal Function Profile    Essential hypertension  -     losartan-hydrochlorothiazide (HYZAAR) 100-12.5 mg per tablet; Take 1 tablet by mouth daily.  Dispense: 90 tablet; Refill: 3  -     amLODIPine (NORVASC) 5 MG tablet; Take 1 tablet (5 mg total) by mouth daily.  Dispense: 90 tablet; Refill: 3    Edema, unspecified type    Prostate cancer (H)        Medications after visit  Current Outpatient Medications   Medication Sig Dispense Refill     amLODIPine (NORVASC) 5 MG tablet Take 1 tablet (5 mg total) by mouth daily. 90 tablet 3     aspirin 81 MG EC tablet Take 81 mg by mouth every evening.              losartan-hydrochlorothiazide (HYZAAR) 100-12.5 mg per tablet Take 1 tablet by mouth daily. 90 tablet 3     multivitamin with minerals (THERA-M) 9 mg iron-400 mcg Tab tablet Take 1 tablet by mouth daily.       naproxen sodium (ALEVE) 220 MG tablet  Take 220 mg by mouth at bedtime.       UNABLE TO FIND Take by mouth daily. Med Name:Prevagen Extra Strength (Improves memory)       No current facility-administered medications for this visit.                       Preston London MD  Internal medicine  [facility]  [phone]  [email or web address]    Much or all of the text in this note was generated through the use of Dragon Dictate voice-to-text software. Errors in spelling or words which seem out of context are unintentional.   Sound alike errors, in particular, may have escaped editing.                 Subjective:   Chief Complaint:  Hypertension; Follow-up (Routine 6wk visit); and Medication Refill    Patient doing better  Medication adjustment-resolved edema  No further alcohol  Appetite okay  Bowels  He is doing well by himself    History of prostate cancer  PSA has been elevated at 47  He has no bone pain  He had cryotherapy 2004    Hypertension-There are no cardiovascular, respiratory, neurologic complaints.No claudication.There is no orthostasis.Patient is compliant with medications.  Medications reviewed.   No side effects from medication.    Review of Systems:     Extensive 10-point review of systems was performed. Please see the HPI for problem specific pertinent review of systems.     Patient does note       Otherwise, the following systems are noncontributory including constitutional, eyes, ears, nose and throat, cardiovascular, respiratory, gastrointestinal, genitourinary, musculoskeletal,neurological, skin and/or breast, endocrine, hematologic/lymph, allergic/immunologic and psychiatric.              Medications:  Current Outpatient Medications on File Prior to Visit   Medication Sig     aspirin 81 MG EC tablet Take 81 mg by mouth every evening.            multivitamin with minerals (THERA-M) 9 mg iron-400 mcg Tab tablet Take 1 tablet by mouth daily.     naproxen sodium (ALEVE) 220 MG tablet Take 220 mg by  "mouth at bedtime.     UNABLE TO FIND Take by mouth daily. Med Name:Prevagen Extra Strength (Improves memory)     [DISCONTINUED] amLODIPine (NORVASC) 5 MG tablet Take 1 tablet (5 mg total) by mouth daily.     [DISCONTINUED] losartan-hydrochlorothiazide (HYZAAR) 100-12.5 mg per tablet Take 1 tablet by mouth daily.     No current facility-administered medications on file prior to visit.             Allergies:  Allergies   Allergen Reactions     Cardizem [Diltiazem Hcl] Unknown     Dyazide [Triamterene-Hydrochlorothiazid] Unknown     Lopressor [Metoprolol Tartrate] Unknown     Penicillins Hives     Sulfa (Sulfonamide Antibiotics) Hives     Vasotec [Enalapril Maleate] Cough     Cardura [Doxazosin] Palpitations       PSFHx: Tobacco Status:  He  reports that he quit smoking about 54 years ago. He has a 15.00 pack-year smoking history. He has never used smokeless tobacco.   Alcohol Status:    Social History     Substance and Sexual Activity   Alcohol Use Yes    Comment: 2-4 drinks a day (he won't say for sure)       reports that he quit smoking about 54 years ago. He has a 15.00 pack-year smoking history. He has never used smokeless tobacco. He reports current alcohol use. He reports that he does not use drugs.    Objective:    /64 (Patient Site: Left Arm, Patient Position: Sitting, Cuff Size: Adult Regular)   Pulse 95   Ht 5' 6\" (1.676 m)   Wt 137 lb 1.3 oz (62.2 kg)   SpO2 98%   BMI 22.13 kg/m    Weight:   Wt Readings from Last 3 Encounters:   11/13/19 137 lb 1.3 oz (62.2 kg)   09/27/19 141 lb 0.6 oz (64 kg)   08/30/19 127 lb 6.4 oz (57.8 kg)     BP Readings from Last 10 Encounters:   11/13/19 130/64   09/27/19 160/80   09/02/19 (!) 193/87   08/14/19 180/80   07/03/19 138/78   03/29/19 145/70   08/08/18 126/70   06/08/18 134/60   09/11/17 138/82   03/06/17 138/68         General-appears well, no acute distress.  Patient's color is good  He seems stronger  Weight is decreased but his edema has resolved which was " significant  Neck no adenopathy  Lungs clear moving air very well  2/6 BRANDO  No edema  Abdomen benign without organomegaly  No local spine pain      Review of clinical lab tests  Lab Results   Component Value Date    WBC 9.0 08/30/2019    HGB 11.4 (L) 08/31/2019    HCT 32.8 (L) 08/30/2019     08/30/2019    ALT 9 08/29/2019    AST 20 08/29/2019     08/31/2019    K 4.3 08/31/2019     (H) 08/31/2019    CREATININE 1.55 (H) 08/31/2019    BUN 33 (H) 08/31/2019    CO2 18 (L) 08/31/2019    TSH 3.03 08/30/2019    PSA 47.4 (H) 07/03/2019    INR 1.04 03/26/2019    HGBA1C 5.9 03/27/2019       Glucose   Date/Time Value Ref Range Status   08/31/2019 11:27  70 - 125 mg/dL Final   08/30/2019 05:51 AM 94 70 - 125 mg/dL Final   08/29/2019 02:50  70 - 125 mg/dL Final   07/16/2019 01:08 PM 84 70 - 125 mg/dL Final   07/03/2019 02:09  70 - 125 mg/dL Final   03/29/2019 06:47  70 - 125 mg/dL Final   03/28/2019 05:35 AM 90 70 - 125 mg/dL Final   03/27/2019 05:18 AM 92 70 - 125 mg/dL Final   03/26/2019 04:22  70 - 125 mg/dL Final   08/08/2018 03:52  70 - 125 mg/dL Final     No results found for this or any previous visit (from the past 24 hour(s)).    RADIOLOGY: No results found.    Review of recent consultation-

## 2021-06-03 NOTE — PROGRESS NOTES
Call patient  Renal function a bit off  I would like to recheck labs in 6 weeks  Make sure you are drinking plenty of water  Do not take any Advil or Aleve

## 2021-06-03 NOTE — TELEPHONE ENCOUNTER
Left message to call back for: Results  Information to relay to patient:  See below message from PCP    Sherri Alicea, CMA

## 2021-06-04 VITALS
HEIGHT: 65 IN | BODY MASS INDEX: 25.51 KG/M2 | WEIGHT: 153.1 LBS | BODY MASS INDEX: 25.51 KG/M2 | WEIGHT: 153.1 LBS | HEIGHT: 65 IN

## 2021-06-04 VITALS
OXYGEN SATURATION: 97 % | BODY MASS INDEX: 22.7 KG/M2 | HEART RATE: 75 BPM | WEIGHT: 136.4 LBS | RESPIRATION RATE: 16 BRPM | SYSTOLIC BLOOD PRESSURE: 144 MMHG | TEMPERATURE: 97.1 F | DIASTOLIC BLOOD PRESSURE: 72 MMHG

## 2021-06-04 VITALS
HEART RATE: 82 BPM | WEIGHT: 128 LBS | RESPIRATION RATE: 18 BRPM | DIASTOLIC BLOOD PRESSURE: 60 MMHG | OXYGEN SATURATION: 98 % | BODY MASS INDEX: 21.3 KG/M2 | SYSTOLIC BLOOD PRESSURE: 118 MMHG | TEMPERATURE: 99.6 F

## 2021-06-04 VITALS
BODY MASS INDEX: 20.7 KG/M2 | DIASTOLIC BLOOD PRESSURE: 70 MMHG | OXYGEN SATURATION: 100 % | RESPIRATION RATE: 18 BRPM | SYSTOLIC BLOOD PRESSURE: 143 MMHG | TEMPERATURE: 97.6 F | HEART RATE: 90 BPM | WEIGHT: 124.4 LBS

## 2021-06-04 VITALS
SYSTOLIC BLOOD PRESSURE: 123 MMHG | RESPIRATION RATE: 18 BRPM | TEMPERATURE: 98.4 F | HEART RATE: 74 BPM | OXYGEN SATURATION: 96 % | DIASTOLIC BLOOD PRESSURE: 62 MMHG | WEIGHT: 139.2 LBS | BODY MASS INDEX: 23.16 KG/M2

## 2021-06-04 VITALS
TEMPERATURE: 99.6 F | RESPIRATION RATE: 18 BRPM | OXYGEN SATURATION: 98 % | WEIGHT: 132 LBS | BODY MASS INDEX: 21.97 KG/M2 | DIASTOLIC BLOOD PRESSURE: 60 MMHG | SYSTOLIC BLOOD PRESSURE: 118 MMHG | HEART RATE: 82 BPM

## 2021-06-04 VITALS
BODY MASS INDEX: 20.7 KG/M2 | HEART RATE: 82 BPM | OXYGEN SATURATION: 98 % | TEMPERATURE: 99.6 F | DIASTOLIC BLOOD PRESSURE: 76 MMHG | WEIGHT: 124.4 LBS | RESPIRATION RATE: 18 BRPM | SYSTOLIC BLOOD PRESSURE: 152 MMHG

## 2021-06-04 VITALS
BODY MASS INDEX: 20.7 KG/M2 | TEMPERATURE: 99.6 F | DIASTOLIC BLOOD PRESSURE: 76 MMHG | RESPIRATION RATE: 18 BRPM | SYSTOLIC BLOOD PRESSURE: 152 MMHG | HEART RATE: 82 BPM | OXYGEN SATURATION: 98 % | WEIGHT: 124.4 LBS

## 2021-06-04 VITALS
OXYGEN SATURATION: 98 % | DIASTOLIC BLOOD PRESSURE: 76 MMHG | HEART RATE: 82 BPM | SYSTOLIC BLOOD PRESSURE: 152 MMHG | RESPIRATION RATE: 18 BRPM | BODY MASS INDEX: 20.7 KG/M2 | TEMPERATURE: 99.6 F | WEIGHT: 124.4 LBS

## 2021-06-04 VITALS
RESPIRATION RATE: 18 BRPM | TEMPERATURE: 98.9 F | OXYGEN SATURATION: 98 % | DIASTOLIC BLOOD PRESSURE: 70 MMHG | HEART RATE: 94 BPM | BODY MASS INDEX: 21.5 KG/M2 | WEIGHT: 129.2 LBS | SYSTOLIC BLOOD PRESSURE: 151 MMHG

## 2021-06-04 VITALS
TEMPERATURE: 98.3 F | HEART RATE: 72 BPM | BODY MASS INDEX: 20.7 KG/M2 | SYSTOLIC BLOOD PRESSURE: 135 MMHG | WEIGHT: 124.4 LBS | RESPIRATION RATE: 18 BRPM | DIASTOLIC BLOOD PRESSURE: 60 MMHG | OXYGEN SATURATION: 94 %

## 2021-06-04 VITALS
OXYGEN SATURATION: 98 % | WEIGHT: 128 LBS | HEART RATE: 82 BPM | RESPIRATION RATE: 18 BRPM | SYSTOLIC BLOOD PRESSURE: 118 MMHG | BODY MASS INDEX: 21.3 KG/M2 | DIASTOLIC BLOOD PRESSURE: 60 MMHG | TEMPERATURE: 99.6 F

## 2021-06-04 VITALS
SYSTOLIC BLOOD PRESSURE: 144 MMHG | DIASTOLIC BLOOD PRESSURE: 72 MMHG | RESPIRATION RATE: 16 BRPM | TEMPERATURE: 97.1 F | HEART RATE: 75 BPM | BODY MASS INDEX: 22.7 KG/M2 | OXYGEN SATURATION: 97 % | WEIGHT: 136.4 LBS

## 2021-06-04 VITALS
BODY MASS INDEX: 22.03 KG/M2 | DIASTOLIC BLOOD PRESSURE: 64 MMHG | HEIGHT: 66 IN | WEIGHT: 137.08 LBS | SYSTOLIC BLOOD PRESSURE: 130 MMHG | HEART RATE: 95 BPM | OXYGEN SATURATION: 98 %

## 2021-06-04 VITALS
WEIGHT: 138.4 LBS | BODY MASS INDEX: 23.03 KG/M2 | TEMPERATURE: 97 F | HEART RATE: 87 BPM | RESPIRATION RATE: 18 BRPM | OXYGEN SATURATION: 98 % | SYSTOLIC BLOOD PRESSURE: 148 MMHG | DIASTOLIC BLOOD PRESSURE: 72 MMHG

## 2021-06-04 VITALS
DIASTOLIC BLOOD PRESSURE: 74 MMHG | SYSTOLIC BLOOD PRESSURE: 142 MMHG | BODY MASS INDEX: 21.5 KG/M2 | OXYGEN SATURATION: 97 % | TEMPERATURE: 99.2 F | RESPIRATION RATE: 18 BRPM | HEART RATE: 64 BPM | WEIGHT: 129.2 LBS

## 2021-06-04 VITALS
OXYGEN SATURATION: 95 % | WEIGHT: 137 LBS | BODY MASS INDEX: 21.5 KG/M2 | DIASTOLIC BLOOD PRESSURE: 62 MMHG | HEIGHT: 67 IN | SYSTOLIC BLOOD PRESSURE: 104 MMHG | HEART RATE: 62 BPM

## 2021-06-04 VITALS
WEIGHT: 145.5 LBS | TEMPERATURE: 97.8 F | BODY MASS INDEX: 24.21 KG/M2 | RESPIRATION RATE: 18 BRPM | HEART RATE: 77 BPM | DIASTOLIC BLOOD PRESSURE: 59 MMHG | SYSTOLIC BLOOD PRESSURE: 114 MMHG | OXYGEN SATURATION: 100 %

## 2021-06-04 VITALS
SYSTOLIC BLOOD PRESSURE: 130 MMHG | OXYGEN SATURATION: 99 % | TEMPERATURE: 97.5 F | DIASTOLIC BLOOD PRESSURE: 66 MMHG | BODY MASS INDEX: 21.3 KG/M2 | RESPIRATION RATE: 17 BRPM | HEART RATE: 72 BPM | WEIGHT: 128 LBS

## 2021-06-05 NOTE — TELEPHONE ENCOUNTER
Medication Question or Clarification  Who is calling: [facility] pharmacy   What medication are you calling about (include dose and sig)?:   losartan-hydrochlorothiazide (HYZAAR) 100-12.5 mg per tablet 90 tablet 3 11/13/2019     Sig - Route: Take 1 tablet by mouth daily. - Oral    Who prescribed the medication?: Preston London MD  What is your question/concern?: Pharmacy states the above medication is on back order requesting two separate medication prescriptions Losartan, hydrochlorothiazide . Please advise.  Requested Pharmacy: [facility]  Okay to leave a detailed message?: No

## 2021-06-05 NOTE — TELEPHONE ENCOUNTER
Dale Mejia for separate prescriptions requested by pharmacy due to the nationwide back order of Hyzaar?  Prescriptions have been set up for you to review.  Maria Del Carmen MARIN CMA/HAFSA....................10:50 AM

## 2021-06-05 NOTE — TELEPHONE ENCOUNTER
Who is calling:  Patient calling  Reason for Call:  Patient states the  came through and writer verified his current medication list.  Date of last appointment with primary care: 11.13.2019  Okay to leave a detailed message: No

## 2021-06-05 NOTE — PROGRESS NOTES
Scheduled Follow Up Call: Attempt 1 Community Health Worker called and left a message for the patient. If the patient is returning my call, please transfer the patient to Nay at ext. [phone].

## 2021-06-05 NOTE — PROGRESS NOTES
MTM Transition of Care Encounter  Assessment & Plan                                                     Post Discharge Medication Reconciliation Status: discharge medications reconciled, continue medications without change   1. Dysphasia  Improving with current chronic PPI, pantoprazole 20 mg daily.  Per discharge summary, patient to see GI for follow-up of dysphasia.   - Follow up with GI as directed    2. Essential hypertension/ROGER   Needs further assessment. Patient continues to take amlodipine 5 mg daily for hypertension. Both losartan and hctz have been held as recommended.  Recommend f/u BP and BMP at next visit to check kidney function and potassium. If needing an additional BP lowering medication, would recommend use of loop diuretic to prevent hyperkalemia.   - Continue taking amlodipine 5 mg daily and holding losartan and hctz    Future assessment: BMP and BP    3. Hyperkalemia  Needs further assessment. Unknown etiology, likely not related to diet, potentially from ARB use. Recommend rechecking BP at follow up.     4. Pain  Needs improvement. Patient has been taking a daily aleve in the evening for pain. This could also be contributing to his reduced kidney function. Recommended patient hold aleve for now and try taking acetaminophen instead for pain, patient agreeable.   - Paient to discontinue aleve 220 mg daily  - Patient to try taking acetaminophen in the evening for pain as needed    Follow Up  Return in about 20 days (around 2/26/2020) for Primary Care.    Subjective & Objective                                                       Ortiz Ely is a 89 y.o. male called for a transitions of care visit. he was discharged from [facility] on 2/4/20 for hyperkalemia.    Chief Complaint: MIKE call    Medication Adherence/Access: Has his own organizational system, takes medication twice daily.     Dysphagia: Pantoprazole 20 mg daily in the morning. Swallowing has been much better since starting this  medication, no concerns today.  States that he has a scheduled follow-up visit already in about 3 to 4 weeks, per discharge summary patient to schedule follow-up with GI.    Hypertension/ROGER: Amlodipine 5 mg daily in the morning. Both losartan and HCTZ were held in the hospital due to kidney function. States that he checks his BP only in clinic, not at home. Denies any shortness of breath, heart racing, dizziness, pain, or other side effects.   Lab Results   Component Value Date    CREATININE 2.19 (H) 2020     BP Readings from Last 3 Encounters:   20 111/59   19 130/64   19 160/80     Hyperkalemia:  Both losartan and HCTZ were held in the hospital and remain on hold. At admission, patients potassium was 6.1. States that his meals are pretty consistent. He eats cereal for breakfast and prune juice.  Admits that he drinks a lot of juice.  States that his symptoms consist of what ever is around.  States that he is a light eater.   Lab Results   Component Value Date    K 5.0 2020     Pain: Aleve 220 mg every evening before bed, states that he has been taking this every night for a long time as more of a habit than anything else. States that he did not notify the hospital that he was taking this medication. States that he will quit taking them. States that he does have pain in his back, long-term injury from the war.   Lab Results   Component Value Date    CREATININE 2.19 (H) 2020     Memory: Prevagen extra strength - takes one tablet daily in the morning for memory. States that this works very well for him.     PMH: reviewed in EPIC   Allergies/ADRs: reviewed in EPIC   Alcohol: Very very seldom.   Tobacco:   Social History     Tobacco Use   Smoking Status Former Smoker     Packs/day: 1.00     Years: 15.00     Pack years: 15.00     Last attempt to quit: 1965     Years since quittin.1   Smokeless Tobacco Never Used     Recent Vitals:   BP Readings from Last 3 Encounters:    02/04/20 111/59   11/13/19 130/64   09/27/19 160/80      Wt Readings from Last 3 Encounters:   02/03/20 135 lb 3.2 oz (61.3 kg)   11/13/19 137 lb 1.3 oz (62.2 kg)   09/27/19 141 lb 0.6 oz (64 kg)     ----------------    The patient was given a summary of these recommendations by mail    I spent 30 minutes with this patient today;  . All changes were made via collaborative practice agreement with Preston London MD. A copy of the visit note was provided to the patient's provider.     Mohini Condon ([facility] PharmD  Medication Therapy Management Pharmacist  [facility]         Current Outpatient Medications   Medication Sig Dispense Refill     amLODIPine (NORVASC) 5 MG tablet Take 1 tablet (5 mg total) by mouth daily. 90 tablet 3     aspirin 81 MG EC tablet Take 81 mg by mouth every evening.              multivitamin with minerals (THERA-M) 9 mg iron-400 mcg Tab tablet Take 1 tablet by mouth every evening.        pantoprazole (PROTONIX) 20 MG tablet Take 1 tablet (20 mg total) by mouth daily. 30 tablet 0     UNABLE TO FIND Take 1 tablet by mouth every evening. Med Name:Prevagen Extra Strength (Improves memory)        No current facility-administered medications for this visit.

## 2021-06-05 NOTE — PATIENT INSTRUCTIONS - HE
1. Make sure to follow up with gastroenterology regarding swallowing.   2. STOP taking aleve 220 mg daily.    - If pain worsens, would recommend taking acetaminophen (tylenol) 500 mg 1-2 tablets by mouth 2-3 times daily as needed for pain.   - If the pain still does not improve, discuss alternative options with Dr. London.

## 2021-06-05 NOTE — TELEPHONE ENCOUNTER
Received MTM referral from Transition of Care     Patient was not reachable after several attempts, will route to MTM Pharmacist/Provider as an FYI. Left MTM scheduling information on patients voicemail.    Thank you for the referral,    See [facility] Pharmacy Coordinator

## 2021-06-05 NOTE — PROGRESS NOTES
Community Health Worker called and left a message for the patient.  If the patient is returning my call, please transfer the patient to Nay at ext. [phone].   Patient has been mailed a unreachable letter and was provided with CHW contact information if they are interested in accessing Clinic Care Coordination.  Order for Care Management has been closed, no further outreach will be done at this time and patient can be re-referred.

## 2021-06-06 NOTE — PROGRESS NOTES
OFFICE VISIT NOTE            Assessment/Plan for  Ortiz Ely is a 89 y.o. male.  No Patient Care Coordination Note on file.       1.  Dysphasia due to significant GERD, presbyesophagus on PPI with resolution  2.  Chronic kidney disease-now off hydrochlorothiazide, ARB.  3.  Hypertension-currently controlled on amlodipine  4.  Prostate cancer with rising PSA-asymptomatic    Plan:  Continue Nexium indefinitely  Monitor blood pressure on amlodipine  Check kidney profile today  RTC 3 months        Diagnoses and all orders for this visit:    Essential hypertension    Gastroesophageal reflux disease with esophagitis  -     pantoprazole (PROTONIX) 20 MG tablet; Take 1 tablet (20 mg total) by mouth daily.  Dispense: 90 tablet; Refill: 0    Chronic kidney disease, stage III (moderate) (H)    Dysphagia, unspecified type        Preston London MD  Internal medicine  [facility]  [phone]  [email or web address]    This is an electronically verified report by Preston London M.D.  (Note created with Dragon voice recognition and unintended spelling errors and word substitutions may occur)             Subjective:   Chief Complaint:  Follow-up (3 mo f/u-pt states that he is doing well)    Hospitalized recently with some dysphasia  No obstructive etiology noted  Does have presbyesophagus/severe GERD  Placed on PPI in the hospital  Good response    Patient has some chronic kidney disease  He was taken off his hydrochlorothiazide and ARB  He remains on amlodipine    He is not having any alcohol      Medications:  Current Outpatient Medications on File Prior to Visit   Medication Sig     amLODIPine (NORVASC) 5 MG tablet Take 1 tablet (5 mg total) by mouth daily.     aspirin 81 MG EC tablet Take 81 mg by mouth every evening.            melatonin 10 mg Tab Take by mouth.     multivitamin with minerals (THERA-M) 9 mg iron-400 mcg Tab tablet Take 1 tablet by mouth every evening.      pantoprazole  "(PROTONIX) 20 MG tablet Take 1 tablet (20 mg total) by mouth daily.     UNABLE TO FIND Take 1 tablet by mouth every evening. Med Name:Prevagen Extra Strength (Improves memory)      No current facility-administered medications on file prior to visit.      Allergies:  Allergies   Allergen Reactions     Cardizem [Diltiazem Hcl] Unknown     Dyazide [Triamterene-Hydrochlorothiazid] Unknown     Lopressor [Metoprolol Tartrate] Unknown     Penicillins Hives     Sulfa (Sulfonamide Antibiotics) Hives     Vasotec [Enalapril Maleate] Cough     Cardura [Doxazosin] Palpitations       Review of Systems:     Extensive 10-point review of systems was performed. Please see the HPI for problem specific pertinent review of systems.     Patient does note his appetite is fair.  There is no dysphasia    Otherwise, the following systems are noncontributory including constitutional, eyes, ears, nose and throat, cardiovascular, respiratory, gastrointestinal, genitourinary, musculoskeletal,neurological, skin and/or breast, endocrine, hematologic/lymph, allergic/immunologic and psychiatric.      Objective:    /62   Pulse 62   Ht 5' 7\" (1.702 m)   Wt 137 lb (62.1 kg)   SpO2 95% Comment: RA  BMI 21.46 kg/m    Weight:   Wt Readings from Last 3 Encounters:   02/26/20 137 lb (62.1 kg)   02/03/20 135 lb 3.2 oz (61.3 kg)   11/13/19 137 lb 1.3 oz (62.2 kg)     [unfilled]  137 lb (62.1 kg)  Blood pressure-checked-confirmed.   In general, no acute distress.  Weight-stable  No neck adenopathy  Lungs clear  Pulse regular  Cardiac no murmur  No edema    Patient is in good spirits today.  His color is good      Review of clinical lab tests  Lab Results   Component Value Date    WBC 10.2 02/03/2020    HGB 11.1 (L) 02/03/2020    HCT 34.0 (L) 02/03/2020     02/03/2020    ALT 9 08/29/2019    AST 20 08/29/2019     02/04/2020    K 5.0 02/04/2020     (H) 02/04/2020    CREATININE 2.19 (H) 02/04/2020    BUN 53 (H) 02/04/2020    CO2 " 18 (L) 02/04/2020    TSH 3.03 08/30/2019    PSA 49.1 (H) 11/13/2019    INR 1.04 03/26/2019    HGBA1C 5.9 03/27/2019     BUN   Date/Time Value Ref Range Status   02/04/2020 06:39 AM 53 (H) 8 - 28 mg/dL Final   02/03/2020 05:14 PM 53 (H) 8 - 28 mg/dL Final   02/03/2020 12:09 PM 52 (H) 8 - 28 mg/dL Final   11/13/2019 01:19 PM 52 (H) 8 - 28 mg/dL Final   08/31/2019 11:27 AM 33 (H) 8 - 28 mg/dL Final   08/30/2019 05:51 AM 32 (H) 8 - 28 mg/dL Final   08/29/2019 02:50 PM 37 (H) 8 - 28 mg/dL Final   07/16/2019 01:08 PM 46 (H) 8 - 28 mg/dL Final   07/03/2019 02:09 PM 60 (H) 8 - 28 mg/dL Final         Glucose   Date/Time Value Ref Range Status   02/04/2020 06:39 AM 88 70 - 125 mg/dL Final   02/03/2020 05:14  (H) 70 - 125 mg/dL Final   02/03/2020 12:09  70 - 125 mg/dL Final   11/13/2019 01:19  70 - 125 mg/dL Final   08/31/2019 11:27  70 - 125 mg/dL Final   08/30/2019 05:51 AM 94 70 - 125 mg/dL Final   08/29/2019 02:50  70 - 125 mg/dL Final   07/16/2019 01:08 PM 84 70 - 125 mg/dL Final   07/03/2019 02:09  70 - 125 mg/dL Final   03/29/2019 06:47  70 - 125 mg/dL Final     No results found for this or any previous visit (from the past 24 hour(s)).    Xr Neck Soft Tissue    Result Date: 2/3/2020  EXAM: XR NECK SOFT TISSUE LOCATION: [facility] DATE/TIME: 2/3/2020 12:34 PM INDICATION: swallowed fb COMPARISON: None.     Anterior C2-C3 osteophyte which bulges anteriorly and indents the posterior esophageal mucosal surface. Hooked epiglottis. No prevertebral or epiglottic soft tissue swelling. No radiopaque foreign material seen.    Xr Chest 2 Views    Result Date: 2/3/2020  EXAM: XR CHEST 2 VIEWS LOCATION: [facility] DATE/TIME: 2/3/2020 12:34 PM INDICATION: swallowed fb, sore throat COMPARISON: 01/08/2017, chest CT 03/26/2019     No radiodense foreign body. ML scarring in the right midlung again noted. No signs of pneumonia or failure. Heart and pulmonary vascularity are  normal. Prior cholecystectomy.    Xr Gastrografin Esophagram    Result Date: 2/3/2020  EXAM: XR GASTROGRAFIN ESOPHAGRAM LOCATION: [facility] DATE/TIME: 2/3/2020 3:59 PM INDICATION: dysphagia COMPARISON: None. TECHNIQUE: Routine. FINDINGS: FLUOROSCOPIC TIME: 1.8 minutes NUMBER OF IMAGES: 9 ESOPHAGUS: The patient was initially given Gastrografin. There was no perforation or foreign body seen. The cervical esophagus was difficult to visualize with the patient in the upright position due to cervical kyphosis and anterior head tilt. Therefore the patient was placed prone lateral, and thin liquid barium given. There is a prominent cricopharyngeus muscle. No foreign material or esophageal mass seen. Incidentally noted is large volume gastroesophageal reflux.     CONCLUSION: 1.  No foreign body seen. 2.  Presbyesophagus. 3.  Large volume gastroesophageal reflux.

## 2021-06-07 NOTE — ANESTHESIA POSTPROCEDURE EVALUATION
Patient: Ortiz Ely  Procedure(s):  INTERNAL FIXATION, FRACTURE, TROCHANTERIC, LEFT HIP, USING PINS OR RODS (Left)  Anesthesia type: spinal    Patient location: PACU  Last vitals:   Vitals Value Taken Time   /76 5/4/2020  7:14 PM   Temp 37.1  C (98.7  F) 5/4/2020  7:14 PM   Pulse 84 5/4/2020  7:14 PM   Resp 22 5/4/2020  7:14 PM   SpO2 95 % 5/4/2020  6:32 PM     Post vital signs: stable  Level of consciousness: lethargic and responds to simple questions  Post-anesthesia pain: pain controlled  Post-anesthesia nausea and vomiting: no  Pulmonary: unassisted, return to baseline  Cardiovascular: stable and blood pressure at baseline  Hydration: adequate  Anesthetic events: no    QCDR Measures:  ASA# 11 - Winter-op Cardiac Arrest: ASA11B - Patient did NOT experience unanticipated cardiac arrest  ASA# 12 - Winter-op Mortality Rate: ASA12B - Patient did NOT die  ASA# 13 - PACU Re-Intubation Rate: ASA13B - Patient did NOT require a new airway mgmt  ASA# 10 - Composite Anes Safety: ASA10A - No serious adverse event    Additional Notes:

## 2021-06-07 NOTE — ANESTHESIA PREPROCEDURE EVALUATION
Anesthesia Evaluation      Patient summary reviewed   No history of anesthetic complications     Airway   Mallampati: II  Neck ROM: full   Pulmonary - normal exam                          Cardiovascular   (+) hypertension, CAD, ,     ECG reviewed   murmur      Neuro/Psych      Endo/Other    (+) diabetes mellitus,      GI/Hepatic/Renal    (+)   chronic renal disease,           Dental - normal exam                        Anesthesia Plan  Planned anesthetic: spinal    ASA 3     Anesthetic plan and risks discussed with: patient    Post-op plan: routine recovery

## 2021-06-07 NOTE — ANESTHESIA PROCEDURE NOTES
Spinal Block    Patient location during procedure: OR  Start time: 5/4/2020 2:21 PM  End time: 5/4/2020 2:23 PM  Reason for block: primary anesthetic    Staffing:  Performing  Anesthesiologist: Amira Moeller MD    Preanesthetic Checklist  Completed: patient identified, risks, benefits, and alternatives discussed, timeout performed, consent obtained, airway assessed, oxygen available, suction available, emergency drugs available and hand hygiene performed  Spinal Block  Patient position: sitting  Prep: Betadine  Patient monitoring: heart rate, cardiac monitor, continuous pulse ox and blood pressure  Approach: right paramedian  Location: L4-5  Injection technique: single-shot  Needle type: pencil-tip   Needle gauge: 24 G    Assessment  Sensory level: T8

## 2021-06-07 NOTE — ANESTHESIA CARE TRANSFER NOTE
Last vitals:   Vitals:    05/04/20 1627   BP: 148/65   Pulse: 84   Resp: 20   Temp: 36.8  C (98.3  F)   SpO2: 100%     Patient's level of consciousness is drowsy  Spontaneous respirations: yes  Maintains airway independently: yes  Dentition unchanged: yes  Oropharynx: oropharynx clear of all foreign objects    QCDR Measures:  ASA# 20 - Surgical Safety Checklist: WHO surgical safety checklist completed prior to induction    PQRS# 430 - Adult PONV Prevention: 4558F - Pt received => 2 anti-emetic agents (different classes) preop & intraop  ASA# 8 - Peds PONV Prevention: NA - Not pediatric patient, not GA or 2 or more risk factors NOT present  PQRS# 424 - Winter-op Temp Management: 4559F - At least one body temp DOCUMENTED => 35.5C or 95.9F within required timeframe  PQRS# 426 - PACU Transfer Protocol: - Transfer of care checklist used  ASA# 14 - Acute Post-op Pain: ASA14B - Patient did NOT experience pain >= 7 out of 10
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
## 2021-06-08 NOTE — PROGRESS NOTES
[facility]    Facility:   [facility] [[id number]]   Code Status: DNR/DNI and POLST AVAILABLE      CHIEF COMPLAINT/REASON FOR VISIT:  Chief Complaint   Patient presents with     Problem Visit     DVT       HISTORY:      HPI: Ortiz is a 89 y.o. male who  has a past medical history of Alcohol abuse, Angioedema, Cholelithiasis, Coronary stenosis, HTN (hypertension), and Prostate cancer (H) ().  Here he was recently hospitalized at [facility] from May 2 to May 10, 2020.  The discharging provider summarized the hospitalization in previous notes.     Today Ortiz is being evaluated after having an ultrasound for a DVT return with a positive finding.  Ortiz states that he is fine and he does not have any new concerns or issues to report.  He has no pains.  He states the only time his leg hurts is when he is working out in therapy.  He has good pulses in his feet.  He agrees to treatment for DVT.  This is considered a provoked DVT given recent surgical procedure.  Ortiz will be anticoagulated for a total of 90 days.  Ortiz denies any other concerns including fevers/chills, cough or cold symptoms, headaches, vision changes, chest pain/pressure, difficulty breathing, SOB, abdominal pain, nausea, vomiting, diarrhea, dysuria, increasing weakness, increasing pain.     Past Medical History:   Diagnosis Date     Alcohol abuse     Multiple episodes dating back to      Angioedema      Cholelithiasis      Coronary stenosis      HTN (hypertension)      Prostate cancer (H)     Cryotherapy             Family History   Problem Relation Age of Onset     Alcoholism Unknown      Other Mother         old age,  at 88     Melanoma Father      Social History     Socioeconomic History     Marital status:      Spouse name: Not on file     Number of children: 2     Years of education: Not on file     Highest education level: Not on file   Occupational History     Occupation: Retired     Social Needs     Financial resource strain: Not on file     Food insecurity     Worry: Not on file     Inability: Not on file     Transportation needs     Medical: Not on file     Non-medical: Not on file   Tobacco Use     Smoking status: Former Smoker     Packs/day: 1.00     Years: 15.00     Pack years: 15.00     Last attempt to quit: 1965     Years since quittin.4     Smokeless tobacco: Never Used   Substance and Sexual Activity     Alcohol use: Yes     Comment: 2-4 drinks a day (he won't say for sure)     Drug use: No     Sexual activity: Not on file   Lifestyle     Physical activity     Days per week: Not on file     Minutes per session: Not on file     Stress: Not on file   Relationships     Social connections     Talks on phone: Not on file     Gets together: Not on file     Attends [employer] service: Not on file     Active member of club or organization: Not on file     Attends meetings of clubs or organizations: Not on file     Relationship status: Not on file     Intimate partner violence     Fear of current or ex partner: Not on file     Emotionally abused: Not on file     Physically abused: Not on file     Forced sexual activity: Not on file   Other Topics Concern     Not on file   Social History Narrative    Second Marriage, 2 children from his first marriage.  No living will but he wants to be a full code.     Ruma Reyna 08    Judi- 12/10/06    [employer] order    Daughter-Ml    Retired [employer]    Eva kapoor        REVIEW OF SYSTEM:  Pertinent items are noted in HPI.    PHYSICAL EXAM:   /66   Pulse 72   Temp 97.5  F (36.4  C)   Resp 17   Wt 128 lb (58.1 kg)   SpO2 99%   BMI 21.30 kg/m    A limited exam was performed due to recommendations for care during COVID-19 pandemic. Due to the  COVID-19 , except as noted above, the patient was visually observed at a 6 foot plus distance.  An observational exam was performed in  an effort to keep patient safe from COVID-19 and other communicable diseases.     General appearance: alert, appears stated age and cooperative  HEENT: Head is normocephalic with normal hair distribution. No evidence of trauma. Ears: Without lesions or deformity. No acute purulent discharge. Eyes: Conjunctivae pink with no scleral icterus or erythema. Nose: Normal. Oropharnyx: mmm.  Lungs: respirations without effort.  Extremities: extremities normal, atraumatic, no cyanosis.  Skin: Skin color, texture normal. No rashes or lesions on exposed skin.  Bandages are clean, dry and intact.    Neurologic: Grossly normal   Psych: interacts well with caregivers, exhibits logical thought processes and connections, pleasant.      LABS:   None today.     ASSESSMENT:      ICD-10-CM    1. Acute deep vein thrombosis (DVT) of proximal vein of left lower extremity (H)  I82.4Y2        PLAN:    DVT  -Eliquis 10 mg by mouth every 12 hours for 7 days.  -Then change to Eliquis 5 mg by mouth every 12 hours for a total of 90 days.  -This is considered a provoked DVT given recent surgical intervention.  He should not have lifelong anticoagulation at this time.  -Follow-up as needed and per routine.    Otherwise continue current care plan for all other chronic medical conditions, as they are stable. Encouraged patient to engage in healthy lifestyle behaviors such as engaging in social activities, exercising (PT/OT), eating well, and following care plan. Follow up for routine check-up, or sooner if needed. Will continue to monitor patient and work with nursing staff collaboratively to work toward positive patient outcomes.    Electronically signed by: Saritha Levin CNP

## 2021-06-08 NOTE — PROGRESS NOTES
[facility]    Facility:   [facility] [[id number]]   Code Status: DNR/DNI and POLST AVAILABLE      CHIEF COMPLAINT/REASON FOR VISIT:  Chief Complaint   Patient presents with     Problem Visit     PTSD, agitation       HISTORY:      HPI: Ortiz is a 89 y.o. male who  has a past medical history of Alcohol abuse, Angioedema, Cholelithiasis, Coronary stenosis, HTN (hypertension), and Prostate cancer (H) ().  Here he was recently hospitalized at [facility] from May 2 to May 10, 2020.  The discharging provider summarized the hospitalization in previous notes.     Today Ortiz is being evaluated for a follow-up to recent evaluation for night terrors and PTSD.  Ortiz has had ongoing issues for many years with PTSD.  He states that he used to just have his wife hold his hand.  He states that it might help to have a nurse hold his hand in the middle of the night occasionally.  He did start prazosin a few nights ago and states that he does believe there has been a significant change.  He is feeling markedly well at this time and does not have any new concerns or issues to report.  His leg which had copious amounts of drainage is now improving.  We will continue with the same dressing type.  Ortiz denies any other concerns including fevers/chills, cough or cold symptoms, headaches, vision changes, chest pain/pressure, difficulty breathing, SOB, abdominal pain, nausea, vomiting, diarrhea, dysuria, increasing weakness, increasing pain.     Past Medical History:   Diagnosis Date     Alcohol abuse     Multiple episodes dating back to      Angioedema      Cholelithiasis      Coronary stenosis      HTN (hypertension)      Prostate cancer (H)     Cryotherapy             Family History   Problem Relation Age of Onset     Alcoholism Unknown      Other Mother         old age,  at 88     Melanoma Father      Social History     Socioeconomic History     Marital status:       Spouse name: Not on file     Number of children: 2     Years of education: Not on file     Highest education level: Not on file   Occupational History     Occupation: Retired    Social Needs     Financial resource strain: Not on file     Food insecurity     Worry: Not on file     Inability: Not on file     Transportation needs     Medical: Not on file     Non-medical: Not on file   Tobacco Use     Smoking status: Former Smoker     Packs/day: 1.00     Years: 15.00     Pack years: 15.00     Last attempt to quit: 1965     Years since quittin.4     Smokeless tobacco: Never Used   Substance and Sexual Activity     Alcohol use: Yes     Comment: 2-4 drinks a day (he won't say for sure)     Drug use: No     Sexual activity: Not on file   Lifestyle     Physical activity     Days per week: Not on file     Minutes per session: Not on file     Stress: Not on file   Relationships     Social connections     Talks on phone: Not on file     Gets together: Not on file     Attends Jewish service: Not on file     Active member of club or organization: Not on file     Attends meetings of clubs or organizations: Not on file     Relationship status: Not on file     Intimate partner violence     Fear of current or ex partner: Not on file     Emotionally abused: Not on file     Physically abused: Not on file     Forced sexual activity: Not on file   Other Topics Concern     Not on file   Social History Narrative    Second Marriage, 2 children from his first marriage.  No living will but he wants to be a full code.     Ruma Reyna 08    Judi- 12/10/06    [employer]skqtpkif[employer] order    Daughter-Ml    Retired [employer]    Eva kapoor        REVIEW OF SYSTEM:  Pertinent items are noted in HPI.    PHYSICAL EXAM:   /72   Pulse 75   Temp 97.1  F (36.2  C)   Resp 16   Wt 136 lb 6.4 oz (61.9 kg)   SpO2 97%   BMI 22.70 kg/m    A limited exam was performed due to recommendations for care  during COVID-19 pandemic. Due to the 2020 COVID-19 pandemic, except as noted above, the patient was visually observed at a 6 foot plus distance.  An observational exam was performed in an effort to keep patient safe from COVID-19 and other communicable diseases.     General appearance: alert, appears stated age and cooperative  HEENT: Head is normocephalic with normal hair distribution. No evidence of trauma. Ears: Without lesions or deformity. No acute purulent discharge. Eyes: Conjunctivae pink with no scleral icterus or erythema. Nose: Normal. Oropharnyx: mmm.  Lungs: respirations without effort.  Extremities: extremities normal, atraumatic, no cyanosis.  Skin: Skin color, texture normal. No rashes or lesions on exposed skin.  Bandages appear to have moderate amount of serosanguineous drainage on left hip.  Neurologic: Grossly normal   Psych: interacts well with caregivers, exhibits logical thought processes and connections, pleasant.      LABS:   None today.     ASSESSMENT:      ICD-10-CM    1. Agitation  R45.1    2. PTSD (post-traumatic stress disorder)  F43.10    3. Hip fracture requiring operative repair, left, closed, initial encounter (H)  S72.002A        PLAN:    [place]oia, PTSD  -Prazosin 2 mg by mouth at bedtime.  -Refuses psych eval and treat at this time, will continue to encourage and discuss psych eval and treat.   -Did discuss with nursing staff that he feels much better at night with the tears if someone simply holds his hand for a few minutes.  -Follow up as needed and per routine.     Left Hip Fracture  -Tylenol 650 mg by mouth 4 times daily.  -Changed ASA to 81 mg by mouth twice daily. This is for prophylaxis of DVT after surgical repair.  -Oxycodone 2.5 to 5 mg by mouth every 3 hours as needed for significant postop pain.  -Wound dressing change to: Hydrofiber dressing to wound base and cover with ABD, then wrap with kerlix daily.     Otherwise continue current care plan for all other chronic  medical conditions, as they are stable. Encouraged patient to engage in healthy lifestyle behaviors such as engaging in social activities, exercising (PT/OT), eating well, and following care plan. Follow up for routine check-up, or sooner if needed. Will continue to monitor patient and work with nursing staff collaboratively to work toward positive patient outcomes.    Electronically signed by: Saritha Levin CNP

## 2021-06-08 NOTE — PROGRESS NOTES
[facility]    Facility:   [facility] [[id number]]   Code Status: DNR/DNI and POLST AVAILABLE      CHIEF COMPLAINT/REASON FOR VISIT:  Chief Complaint   Patient presents with     Review Of Multiple Medical Conditions     physical deconditioning, fall, left hip fracture       HISTORY:      HPI: Ortiz is a 89 y.o. male who  has a past medical history of Alcohol abuse, Angioedema, Cholelithiasis, Coronary stenosis, HTN (hypertension), and Prostate cancer (H) ().  Here he was recently hospitalized at [facility] from May 2 to May 10, 2020.  The discharging provider summarized the hospitalization in previous notes.     Today Ortiz is being evaluated for a routine review of multiple medical problems while in the TCU. Nursing staff express his wound care needs revision due to the fact that is soaking through his dressing a few times per day. Ortiz states he feels it is fine. He doesn't have any other concerns or issues to report. He has no concerns or issues to report. Ortiz denies any other concerns including fevers/chills, cough or cold symptoms, headaches, vision changes, chest pain/pressure, difficulty breathing, SOB, abdominal pain, nausea, vomiting, diarrhea, dysuria, increasing weakness, increasing pain.     Past Medical History:   Diagnosis Date     Alcohol abuse     Multiple episodes dating back to      Angioedema      Cholelithiasis      Coronary stenosis      HTN (hypertension)      Prostate cancer (H)     Cryotherapy             Family History   Problem Relation Age of Onset     Alcoholism Unknown      Other Mother         old age,  at 88     Melanoma Father      Social History     Socioeconomic History     Marital status:      Spouse name: Not on file     Number of children: 2     Years of education: Not on file     Highest education level: Not on file   Occupational History     Occupation: Retired    Social Needs     Financial resource  strain: Not on file     Food insecurity     Worry: Not on file     Inability: Not on file     Transportation needs     Medical: Not on file     Non-medical: Not on file   Tobacco Use     Smoking status: Former Smoker     Packs/day: 1.00     Years: 15.00     Pack years: 15.00     Last attempt to quit: 1965     Years since quittin.4     Smokeless tobacco: Never Used   Substance and Sexual Activity     Alcohol use: Yes     Comment: 2-4 drinks a day (he won't say for sure)     Drug use: No     Sexual activity: Not on file   Lifestyle     Physical activity     Days per week: Not on file     Minutes per session: Not on file     Stress: Not on file   Relationships     Social connections     Talks on phone: Not on file     Gets together: Not on file     Attends Orthodox service: Not on file     Active member of club or organization: Not on file     Attends meetings of clubs or organizations: Not on file     Relationship status: Not on file     Intimate partner violence     Fear of current or ex partner: Not on file     Emotionally abused: Not on file     Physically abused: Not on file     Forced sexual activity: Not on file   Other Topics Concern     Not on file   Social History Narrative    Second Marriage, 2 children from his first marriage.  No living will but he wants to be a full code.     Ruma Reyna 08    Judi- 12/10/06    [employer]bnwj[employer]-hmirilokho[employer] order    Daughter-Ml    Retired [employer]    Eva kapoor        REVIEW OF SYSTEM:  Pertinent items are noted in HPI.    PHYSICAL EXAM:   /72   Pulse 87   Temp 97  F (36.1  C)   Resp 18   Wt 138 lb 6.4 oz (62.8 kg)   SpO2 98%   BMI 23.03 kg/m    A limited exam was performed due to recommendations for care during COVID-19 pandemic. Due to the  COVID-19 , except as noted above, the patient was visually observed at a 6 foot plus distance.  An observational exam was performed in an effort to keep patient safe from  COVID-19 and other communicable diseases.     General appearance: alert, appears stated age and cooperative  HEENT: Head is normocephalic with normal hair distribution. No evidence of trauma. Ears: Without lesions or deformity. No acute purulent discharge. Eyes: Conjunctivae pink with no scleral icterus or erythema. Nose: Normal. Oropharnyx: mmm.  Lungs: respirations without effort.  Extremities: extremities normal, atraumatic, no cyanosis.  Skin: Skin color, texture normal. No rashes or lesions on exposed skin.  Bandages appear to have moderate amount of serosanguineous drainage on left hip.  Neurologic: Grossly normal   Psych: interacts well with caregivers, exhibits logical thought processes and connections, pleasant.      LABS:   None today.     ASSESSMENT:      ICD-10-CM    1. Fall, initial encounter  W19.XXXA    2. Physical deconditioning  R53.81    3. Closed comminuted intertrochanteric fracture of left femur, initial encounter (H)  S72.142A        PLAN:    Physical Deconditioning  -Continue PT/OT and other therapies as per care plan.  -Encouraged good nutrition and movement habits.   -Discussed care plan and expected course of stay.   -Continue to follow-up per routine schedule or sooner if needed.     Fall  -Please place on fall precautions and monitor patient routinely.  -Encouraged patient to ask for help with all transfers.   -Continue to assess for developing injuries and if patient reports any pain request provider follow-up.    Left Hip Fracture  -Tylenol 650 mg by mouth 4 times daily.  -Changed ASA to 81 mg by mouth twice daily. This is for prophylaxis of DVT after surgical repair.  -Oxycodone 2.5 to 5 mg by mouth every 3 hours as needed for significant postop pain.  -Wound dressing change to: Hydrofiber dressing to wound base and cover with ABD, then wrap with kerlix daily.     Admission history and physical per MD in the next 30 days. At this time continue current care plan for all chronic medical  conditions, as they are stable. Encouraged patient to engage in PT/OT for strengthening and conditioning. Encouraged patient to work closely with nursing staff to ensure any medical complaints are quickly addressed. Follow up this week or sooner if needed. Will continue to monitor patient and work with nursing staff collaboratively to work toward positive patient outcomes.    Electronically signed by: Saritha Levin CNP

## 2021-06-08 NOTE — PROGRESS NOTES
[facility]    Facility:   [facility] [[id number]]   Code Status: DNR/DNI and POLST AVAILABLE      CHIEF COMPLAINT/REASON FOR VISIT:  Chief Complaint   Patient presents with     Review Of Multiple Medical Conditions     physical deconditioning, fall, hip fx, confusion       HISTORY:      HPI: Ortiz is a 89 y.o. male who  has a past medical history of Alcohol abuse, Angioedema, Cholelithiasis, Coronary stenosis, HTN (hypertension), and Prostate cancer (H) (2004).  Here he was recently hospitalized at [facility] from May 2 to May 10, 2020.  The discharging provider summarized the hospitalization in previous notes.     Today Ortiz is being evaluated for a routine review of multiple medical problems. Ortiz is confused. He seems more confused than previous visits, however nursing staff believe that this is his baseline. He has had this type behavior on and off since coming to the facility. Ortiz continues to have falls or near falls. He has not been injured. He has no complaints. He states his hip hurts only every so often. Vitals have been stable. Ortiz has been working with PT/OT and feels it is going good. Appetite is poor--on remeron for sleep and to aid in appetite. He has not been eating or drinking well. Will continue to work with nursing staff and therapy staff to ensure optimal outcomes. Ortiz denies any other concerns including fevers/chills, cough or cold symptoms, headaches, vision changes, chest pain/pressure, difficulty breathing, SOB, abdominal pain, nausea, vomiting, diarrhea, dysuria, increasing weakness, increasing pain.     Past Medical History:   Diagnosis Date     Alcohol abuse     Multiple episodes dating back to 2001     Angioedema      Cholelithiasis      Coronary stenosis      HTN (hypertension)      Prostate cancer (H) 2004    Cryotherapy             Family History   Problem Relation Age of Onset     Alcoholism Unknown      Other Mother         old  age,  at 88     Melanoma Father      Social History     Socioeconomic History     Marital status:      Spouse name: Not on file     Number of children: 2     Years of education: Not on file     Highest education level: Not on file   Occupational History     Occupation: Retired    Social Needs     Financial resource strain: Not on file     Food insecurity     Worry: Not on file     Inability: Not on file     Transportation needs     Medical: Not on file     Non-medical: Not on file   Tobacco Use     Smoking status: Former Smoker     Packs/day: 1.00     Years: 15.00     Pack years: 15.00     Last attempt to quit: 1965     Years since quittin.4     Smokeless tobacco: Never Used   Substance and Sexual Activity     Alcohol use: Yes     Comment: 2-4 drinks a day (he won't say for sure)     Drug use: No     Sexual activity: Not on file   Lifestyle     Physical activity     Days per week: Not on file     Minutes per session: Not on file     Stress: Not on file   Relationships     Social connections     Talks on phone: Not on file     Gets together: Not on file     Attends Hindu service: Not on file     Active member of club or organization: Not on file     Attends meetings of clubs or organizations: Not on file     Relationship status: Not on file     Intimate partner violence     Fear of current or ex partner: Not on file     Emotionally abused: Not on file     Physically abused: Not on file     Forced sexual activity: Not on file   Other Topics Concern     Not on file   Social History Narrative    Second Marriage, 2 children from his first marriage.  No living will but he wants to be a full code.     Ruma Reyna 08    Judi- 12/10/06    [employer]nwhf[employer]-ccldjermn[employer] order    Daughter-Ml    Retired  [employer]    Eva kapoor        REVIEW OF SYSTEM:  Pertinent items are noted in HPI.    PHYSICAL EXAM:   /70   Pulse 94   Temp 98.9  F (37.2  C)   Resp 18   Wt 129 lb  3.2 oz (58.6 kg)   SpO2 98%   BMI 21.50 kg/m    A limited exam was performed due to recommendations for care during COVID-19 pandemic. Due to the 2020 COVID-19 pandemic, except as noted above, the patient was visually observed at a 6 foot plus distance.  An observational exam was performed in an effort to keep patient safe from COVID-19 and other communicable diseases.     General appearance: alert, appears stated age and cooperative  HEENT: Head is normocephalic with normal hair distribution. No evidence of trauma. Ears: Without lesions or deformity. No acute purulent discharge. Eyes: Conjunctivae pink with no scleral icterus or erythema. Nose: Normal. Oropharnyx: mmm.  Lungs: respirations without effort.  Extremities: extremities normal, atraumatic, no cyanosis.  Skin: Skin color, texture normal. No rashes or lesions on exposed skin.    Neurologic: Grossly normal   Psych: interacts well with caregivers, exhibits logical thought processes and connections, pleasant.      LABS:   None today.     ASSESSMENT:      ICD-10-CM    1. Confusion  R41.0    2. Closed comminuted intertrochanteric fracture of left femur, initial encounter (H)  S72.142A    3. Physical deconditioning  R53.81    4. Acute deep vein thrombosis (DVT) of proximal vein of left lower extremity (H)  I82.4Y2        PLAN:    DVT  -Eliquis 10 mg by mouth every 12 hours for 7 days.  -Then change to Eliquis 5 mg by mouth every 12 hours for a total of 90 days.  -This is considered a provoked DVT given recent surgical intervention.  He should not have lifelong anticoagulation at this time.  -Follow-up as needed and per routine.    Confusion  -CBC, BMP.  -Encourage fluids.  -This appears that this may be his baseline.  -May want to discontinue oxycodone.     Physical Deconditioning  -Continue PT/OT and other therapies as per care plan.  -Encouraged good nutrition and movement habits.   -Discussed care plan and expected course of stay.   -Continue to follow-up  per routine schedule or sooner if needed.     Fall  -Please place on fall precautions and monitor patient routinely.  -Encouraged patient to ask for help with all transfers.   -Continue to assess for developing injuries and if patient reports any pain request provider follow-up.    Left Hip Fracture  -Tylenol 650 mg by mouth 4 times daily.  -Changed ASA to 81 mg by mouth twice daily. This is for prophylaxis of DVT after surgical repair.  -Oxycodone 2.5 to 5 mg by mouth every 3 hours as needed for significant postop pain.  -Wound dressing change to: Hydrofiber dressing to wound base and cover with ABD, then wrap with kerlix daily.   -Follow with Ortho.    Otherwise continue current care plan for all other chronic medical conditions, as they are stable. Encouraged patient to engage in healthy lifestyle behaviors such as engaging in social activities, exercising (PT/OT), eating well, and following care plan. Follow up for routine check-up, or sooner if needed. Will continue to monitor patient and work with nursing staff collaboratively to work toward positive patient outcomes.    Electronically signed by: Saritha Levin CNP

## 2021-06-08 NOTE — PROGRESS NOTES
[facility]    Facility:   [facility] [[id number]]   Code Status: DNR/DNI and POLST AVAILABLE      CHIEF COMPLAINT/REASON FOR VISIT:  Chief Complaint   Patient presents with     Problem Visit     refusal to eat, weakness       HISTORY:      HPI: Ortiz is a 89 y.o. male who  has a past medical history of Alcohol abuse, Angioedema, Cholelithiasis, Coronary stenosis, HTN (hypertension), and Prostate cancer (H) ().  Here he was recently hospitalized at [facility] from May 2 to May 10, 2020.  The discharging provider summarized the hospitalization in previous notes.     Today Ortiz is being evaluated for a follow-up to his recent issues with failure to thrive per request of nursing staff. Did review case with nursing staff. They share that he has been continuing to fail. He is now refusing to eat. He is stating he is going on a hunger strike. When I speak to him about this, he shares that he is just not hungry. Ortiz states he will eat when he is ready. Nurse Jeffry reports that he has been telling him he is on a hunger strike and that he does not care anymore. Ortiz denies this to writer. He again is very vague. Shares he is weak, but does not have any specific problems. Ortiz does not want to go to the hospital. Ortiz denies any other concerns including fevers/chills, cough or cold symptoms, headaches, vision changes, chest pain/pressure, difficulty breathing, SOB, abdominal pain, nausea, vomiting, diarrhea, dysuria, increasing weakness, increasing pain.     Past Medical History:   Diagnosis Date     Alcohol abuse     Multiple episodes dating back to      Angioedema      Cholelithiasis      Coronary stenosis      HTN (hypertension)      Prostate cancer (H)     Cryotherapy             Family History   Problem Relation Age of Onset     Alcoholism Unknown      Other Mother         old age,  at 88     Melanoma Father      Social History     Socioeconomic History      Marital status:      Spouse name: Not on file     Number of children: 2     Years of education: Not on file     Highest education level: Not on file   Occupational History     Occupation: Retired    Social Needs     Financial resource strain: Not on file     Food insecurity     Worry: Not on file     Inability: Not on file     Transportation needs     Medical: Not on file     Non-medical: Not on file   Tobacco Use     Smoking status: Former Smoker     Packs/day: 1.00     Years: 15.00     Pack years: 15.00     Last attempt to quit: 1965     Years since quittin.4     Smokeless tobacco: Never Used   Substance and Sexual Activity     Alcohol use: Yes     Comment: 2-4 drinks a day (he won't say for sure)     Drug use: No     Sexual activity: Not on file   Lifestyle     Physical activity     Days per week: Not on file     Minutes per session: Not on file     Stress: Not on file   Relationships     Social connections     Talks on phone: Not on file     Gets together: Not on file     Attends [employer] service: Not on file     Active member of club or organization: Not on file     Attends meetings of clubs or organizations: Not on file     Relationship status: Not on file     Intimate partner violence     Fear of current or ex partner: Not on file     Emotionally abused: Not on file     Physically abused: Not on file     Forced sexual activity: Not on file   Other Topics Concern     Not on file   Social History Narrative    Second Marriage, 2 children from his first marriage.  No living will but he wants to be a full code.     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    PHYSICAL EXAM:   /60   Pulse 72   Temp 98.3  F (36.8  C)   Resp 18   Wt 124 lb 6.4 oz (56.4 kg)   SpO2 94%   BMI 20.70 kg/m    A limited exam was performed  due to recommendations for care during COVID-19 pandemic. Due to the 2020 COVID-19 pandemic, except as noted above, the patient was visually observed at a 6 foot plus distance.  An observational exam was performed in an effort to keep patient safe from COVID-19 and other communicable diseases.     General appearance: alert, appears stated age and cooperative  HEENT: Head is normocephalic with normal hair distribution. No evidence of trauma. Ears: Without lesions or deformity. No acute purulent discharge. Eyes: Conjunctivae pink with no scleral icterus or erythema. Nose: Normal. Oropharnyx: mmm.  Lungs: respirations without effort.  Extremities: extremities normal, atraumatic, no cyanosis.  Skin: Skin color, texture normal. No rashes or lesions on exposed skin.    Neurologic: Grossly normal   Psych: interacts well with caregivers, exhibits illogical thought processes and connections with evidence of dementia, pleasant.      LABS:   None today.     ASSESSMENT:      ICD-10-CM    1. Adult failure to thrive  R62.7        PLAN:    Confusion, AFTT  -Psych eval and treat.   -Encourage fluids.  -SLP to eval and treat.   -Unsure as to what is causing decline at this time. Ortiz is quite vague and states that he is fine, reports no problems.   -Care conference requested with family.   -Encourage nutrition, encourage supplementation.                                                                                                                                                                                                                                            Otherwise continue current care plan for all other chronic medical conditions, as they are stable. Encouraged patient to engage in healthy lifestyle behaviors such as engaging in social activities, exercising (PT/OT), eating well, and following care plan. Follow up for routine check-up, or sooner if needed. Will continue to monitor patient and work with nursing staff  collaboratively to work toward positive patient outcomes.    Electronically signed by: Sairtha Levin CNP
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
## 2021-06-08 NOTE — PROGRESS NOTES
[facility]    Facility:   [facility] [[id number]]   Code Status: DNR/DNI and POLST AVAILABLE      CHIEF COMPLAINT/REASON FOR VISIT:  Chief Complaint   Patient presents with     Problem Visit     nightmares, anxiety       HISTORY:      HPI: Ortiz is a 89 y.o. male who  has a past medical history of Alcohol abuse, Angioedema, Cholelithiasis, Coronary stenosis, HTN (hypertension), and Prostate cancer (H) ().  Here he was recently hospitalized at [facility] from May 2 to May 10, 2020.  The discharging provider summarized the hospitalization in previous notes.     Today Ortiz is being evaluated by request of nursing staff for ongoing issues with paranoia and terror on the evening and night shifts. Ortiz states that this has been an ongoing issue for him. He has been in [place]. He has had issues since that time. He used to have his wife hold his had at night and that helped. He just doesn't want to think about what he has seen. He states that he hasn't been treated before. [facility] He does not have any access to that service at that time. He is agreeable to using a medication at this time. Ortiz denies any other concerns including fevers/chills, cough or cold symptoms, headaches, vision changes, chest pain/pressure, difficulty breathing, SOB, abdominal pain, nausea, vomiting, diarrhea, dysuria, increasing weakness, increasing pain.     Past Medical History:   Diagnosis Date     Alcohol abuse     Multiple episodes dating back to      Angioedema      Cholelithiasis      Coronary stenosis      HTN (hypertension)      Prostate cancer (H)     Cryotherapy             Family History   Problem Relation Age of Onset     Alcoholism Unknown      Other Mother         old age,  at 88     Melanoma Father      Social History     Socioeconomic History     Marital status:      Spouse name: Not on file     Number of children: 2     Years of  education: Not on file     Highest education level: Not on file   Occupational History     Occupation: Retired    Social Needs     Financial resource strain: Not on file     Food insecurity     Worry: Not on file     Inability: Not on file     Transportation needs     Medical: Not on file     Non-medical: Not on file   Tobacco Use     Smoking status: Former Smoker     Packs/day: 1.00     Years: 15.00     Pack years: 15.00     Last attempt to quit: 1965     Years since quittin.4     Smokeless tobacco: Never Used   Substance and Sexual Activity     Alcohol use: Yes     Comment: 2-4 drinks a day (he won't say for sure)     Drug use: No     Sexual activity: Not on file   Lifestyle     Physical activity     Days per week: Not on file     Minutes per session: Not on file     Stress: Not on file   Relationships     Social connections     Talks on phone: Not on file     Gets together: Not on file     Attends Uatsdin service: Not on file     Active member of club or organization: Not on file     Attends meetings of clubs or organizations: Not on file     Relationship status: Not on file     Intimate partner violence     Fear of current or ex partner: Not on file     Emotionally abused: Not on file     Physically abused: Not on file     Forced sexual activity: Not on file   Other Topics Concern     Not on file   Social History Narrative    Second Marriage, 2 children from his first marriage.  No living will but he wants to be a full code.     Ruma Reyna 08    Judi- 12/10/06    [id number] order    Daughter-Ml    Retired [employer]    Eva kapoor        REVIEW OF SYSTEM:  Pertinent items are noted in HPI.    PHYSICAL EXAM:   /62   Pulse 74   Temp 98.4  F (36.9  C)   Resp 18   Wt 139 lb 3.2 oz (63.1 kg)   SpO2 96%   BMI 23.16 kg/m    A limited exam was performed due to recommendations for care during COVID-19 pandemic. Due to the  COVID-19 pandemic,  except as noted above, the patient was visually observed at a 6 foot plus distance.  An observational exam was performed in an effort to keep patient safe from COVID-19 and other communicable diseases.     General appearance: alert, appears stated age and cooperative  HEENT: Head is normocephalic with normal hair distribution. No evidence of trauma. Ears: Without lesions or deformity. No acute purulent discharge. Eyes: Conjunctivae pink with no scleral icterus or erythema. Nose: Normal. Oropharnyx: mmm.  Lungs: respirations without effort.  Extremities: extremities normal, atraumatic, no cyanosis.  Skin: Skin color, texture normal. No rashes or lesions on exposed skin.  Bandages appear to have moderate amount of serosanguineous drainage on left hip.  Neurologic: Grossly normal   Psych: interacts well with caregivers, exhibits logical thought processes and connections, pleasant.      LABS:   None today.     ASSESSMENT:      ICD-10-CM    1. Paranoia (H)  F22    2. PTSD (post-traumatic stress disorder)  F43.10        PLAN:    P[place]oia, PTSD  -Start Prazosin 1 mg by mouth nightly x 3 days, then increase to 2 mg by mouth at bedtime.   -Psych eval and treat.   -Follow up as needed and per routine.     Otherwise continue current care plan for all other chronic medical conditions, as they are stable. Encouraged patient to engage in healthy lifestyle behaviors such as engaging in social activities, exercising (PT/OT), eating well, and following care plan. Follow up for routine check-up, or sooner if needed. Will continue to monitor patient and work with nursing staff collaboratively to work toward positive patient outcomes.    Electronically signed by: Saritha Levin CNP

## 2021-06-08 NOTE — PROGRESS NOTES
[facility]    Facility:   [facility] [[id number]]   Code Status: DNR/DNI and POLST AVAILABLE      CHIEF COMPLAINT/REASON FOR VISIT:  Chief Complaint   Patient presents with     Follow Up     Falls, confusion       HISTORY:      HPI: Ortiz is a 89 y.o. male who  has a past medical history of Alcohol abuse, Angioedema, Cholelithiasis, Coronary stenosis, HTN (hypertension), and Prostate cancer (H) (2004).  Here he was recently hospitalized at [facility] from May 2 to May 10, 2020.  The discharging provider summarized the hospitalization in previous notes.     Today Ortiz is being evaluated for a follow-up to recent falls and confusion.  Ortiz states that he has been fine.  He does not recall any recent falls.  He was on Monday about senior care out of bed and nearly falling.  He states that he is not quite sure what is wrong with him.  We did discuss his labs in detail.  Also discussed falling and risk of injury while on blood thinners.  Ortiz does not seem to fully comprehend and has not for quite some time.  He has had good days and bad days since being with us.  It appears that he is significantly worse at night.  Ortiz does not have any complaints or issues to report.  Nursing staff continue to just report confusion and falls.  May be worth doing a UTI, however he would be essentially asymptomatic and may be difficult to tell whether this is asymptomatic bacteriuria or if it was truly a UTI.  Will defer at this time and determine with family what they prefer.  Ortiz denies any other concerns including fevers/chills, cough or cold symptoms, headaches, vision changes, chest pain/pressure, difficulty breathing, SOB, abdominal pain, nausea, vomiting, diarrhea, dysuria, increasing weakness, increasing pain.     Past Medical History:   Diagnosis Date     Alcohol abuse     Multiple episodes dating back to 2001     Angioedema      Cholelithiasis      Coronary stenosis      HTN  (hypertension)      Prostate cancer (H) 2004    Cryotherapy             Family History   Problem Relation Age of Onset     Alcoholism Unknown      Other Mother         old age,  at 88     Melanoma Father      Social History     Socioeconomic History     Marital status:      Spouse name: Not on file     Number of children: 2     Years of education: Not on file     Highest education level: Not on file   Occupational History     Occupation: Retired    Social Needs     Financial resource strain: Not on file     Food insecurity     Worry: Not on file     Inability: Not on file     Transportation needs     Medical: Not on file     Non-medical: Not on file   Tobacco Use     Smoking status: Former Smoker     Packs/day: 1.00     Years: 15.00     Pack years: 15.00     Last attempt to quit: 1965     Years since quittin.4     Smokeless tobacco: Never Used   Substance and Sexual Activity     Alcohol use: Yes     Comment: 2-4 drinks a day (he won't say for sure)     Drug use: No     Sexual activity: Not on file   Lifestyle     Physical activity     Days per week: Not on file     Minutes per session: Not on file     Stress: Not on file   Relationships     Social connections     Talks on phone: Not on file     Gets together: Not on file     Attends Muslim service: Not on file     Active member of club or organization: Not on file     Attends meetings of clubs or organizations: Not on file     Relationship status: Not on file     Intimate partner violence     Fear of current or ex partner: Not on file     Emotionally abused: Not on file     Physically abused: Not on file     Forced sexual activity: Not on file   Other Topics Concern     Not on file   Social History Narrative    Second Marriage, 2 children from his first marriage.  No living will but he wants to be a full code.     Ruma Reyna 08    Judi- 12/10/06    [id number][id number] order    Daughter-Ml    Retired   [employer]    Eva kapoor        REVIEW OF SYSTEM:  Pertinent items are noted in HPI.    PHYSICAL EXAM:   /74   Pulse 64   Temp 99.2  F (37.3  C)   Resp 18   Wt 129 lb 3.2 oz (58.6 kg)   SpO2 97%   BMI 21.50 kg/m    A limited exam was performed due to recommendations for care during COVID-19 pandemic. Due to the 2020 COVID-19 pandemic, except as noted above, the patient was visually observed at a 6 foot plus distance.  An observational exam was performed in an effort to keep patient safe from COVID-19 and other communicable diseases.     General appearance: alert, appears stated age and cooperative  HEENT: Head is normocephalic with normal hair distribution. No evidence of trauma. Ears: Without lesions or deformity. No acute purulent discharge. Eyes: Conjunctivae pink with no scleral icterus or erythema. Nose: Normal. Oropharnyx: mmm.  Lungs: respirations without effort.  Extremities: extremities normal, atraumatic, no cyanosis.  Skin: Skin color, texture normal. No rashes or lesions on exposed skin.    Neurologic: Grossly normal   Psych: interacts well with caregivers, exhibits logical thought processes and connections with evidence of dementia, pleasant.      LABS:   None today.     ASSESSMENT:      ICD-10-CM    1. Fall, initial encounter  W19.XXXA    2. Confusion  R41.0        PLAN:    Confusion  -CBC, BMP that were done earlier in the week were appropriate and without significant issue or change.  -Encourage fluids.  -This appears that this may be his baseline.    Fall  -Please place on fall precautions and monitor patient routinely.  -Encouraged patient to ask for help with all transfers.   -Continue to assess for developing injuries and if patient reports any pain request provider follow-up.    Otherwise continue current care plan for all other chronic medical conditions, as they are stable. Encouraged patient to engage in healthy lifestyle behaviors such as engaging in social activities,  exercising (PT/OT), eating well, and following care plan. Follow up for routine check-up, or sooner if needed. Will continue to monitor patient and work with nursing staff collaboratively to work toward positive patient outcomes.    Electronically signed by: Saritha Levin CNP

## 2021-06-08 NOTE — PROGRESS NOTES
"[facility]    Facility:   [facility] [[id number]]   Code Status: DNR/DNI and POLST AVAILABLE      CHIEF COMPLAINT/REASON FOR VISIT:  Chief Complaint   Patient presents with     Review Of Multiple Medical Conditions     Physical deconditioning, fall, left hip fracture       HISTORY:      HPI: Ortiz is a 89 y.o. male who  has a past medical history of Alcohol abuse, Angioedema, Cholelithiasis, Coronary stenosis, HTN (hypertension), and Prostate cancer (H) (2004).  Here he was recently hospitalized at [facility] from May 2 to May 10, 2020.  The discharging provider summarized the hospitalization as follows:    \"Mr. Ortiz Ely is a 89-year-old male with history of CKD, hypertension, history of prostate cancer status post TURP was admitted to [facility] after sustaining a fall.  Work-up revealed left intertrochanteric femoral fracture and was found to have significant anemia.  Patient was admitted and evaluated by orthopedic, underwent open reduction and internal fixation.  He was noted to have mild drainage at the site of surgery and hence his DVT prophylaxis were held per orthopedic recommendations.  Patient was noted to have drop in his hemoglobin needing transfusion preop and postop.   Hemoglobin stable, hemoglobin 8.9 on the day of discharge.  I personally discussed with on-call orthopedic on 5/9, Dr. Simmons and was recommended to resume his aspirin 325 mg p.o. twice daily for DVT prophylaxis.      Patient was also diagnosed with UTI and hospital-acquired pneumonia, was started on Levaquin.  Given his history of CKD, Levaquin was dosed based on renal functions.  Was hypoxic initially which resolved prior to the discharge. Pt is requesting for home portable o2 but doesn't qualify for this. Noted pt to have urinary retention needing multiple straight caths.  Patient was evaluated by urology given his history of prostate cancer.  Urology recommends patient to " "continue straight cath if bladder scan shows urine more than 300.  If patient needing frequent straight caths and self-catheterization will need to follow-up with urology as outpatient.  Patient's daughter, Aminah was called on the day of discharge and updated on the plan.  All questions were answered to the best of my ability.  Patient has been stable and doing well.  Patient stable to be discharged to skilled nursing facility today.  Please refer to discharge nurse and instructions for more details.\"    Today Ortiz is being evaluated for an intake into the TCU.  He states that he is doing quite well.  However nursing staff have called regarding the amount of drainage coming out of his left hip incisions.  They do not appear infected at this time.  They are clean without erythema.  There is a mild wound of drainage but this appears to be serosanguineous drainage.  Ortiz states that he has been eating, drinking and eliminating well.  He has no concerns or issues to report. Ortiz denies any other concerns including fevers/chills, cough or cold symptoms, headaches, vision changes, chest pain/pressure, difficulty breathing, SOB, abdominal pain, nausea, vomiting, diarrhea, dysuria, increasing weakness, increasing pain.     Advanced Care Planning  Spoke with Ortiz regarding code status and advanced care planning.  Ortiz consented to discussion and is aware of possible copay. They are also aware of the necessity of this discussion due to TCU admission. Discussed that he would NOT like to have full resuscitation efforts. However, he would also like to have treatment if he  were to fall ill. he would like some medical treatment for certain medical issues. His sister in law, Eugenia Himan [phone]) would decide for her/him if he  was unable to make medical decisions. he agrees to IV/IM antibiotics. he does not want a feeding tube. There are no Rest[employer]ist obligations he  would like documented at this time.     Past Medical " History:   Diagnosis Date     Alcohol abuse     Multiple episodes dating back to      Angioedema      Cholelithiasis      Coronary stenosis      HTN (hypertension)      Prostate cancer (H) 2004    Cryotherapy             Family History   Problem Relation Age of Onset     Alcoholism Unknown      Other Mother         old age,  at 88     Melanoma Father      Social History     Socioeconomic History     Marital status:      Spouse name: Not on file     Number of children: 2     Years of education: Not on file     Highest education level: Not on file   Occupational History     Occupation: Retired    Social Needs     Financial resource strain: Not on file     Food insecurity     Worry: Not on file     Inability: Not on file     Transportation needs     Medical: Not on file     Non-medical: Not on file   Tobacco Use     Smoking status: Former Smoker     Packs/day: 1.00     Years: 15.00     Pack years: 15.00     Last attempt to quit: 1965     Years since quittin.4     Smokeless tobacco: Never Used   Substance and Sexual Activity     Alcohol use: Yes     Comment: 2-4 drinks a day (he won't say for sure)     Drug use: No     Sexual activity: Not on file   Lifestyle     Physical activity     Days per week: Not on file     Minutes per session: Not on file     Stress: Not on file   Relationships     Social connections     Talks on phone: Not on file     Gets together: Not on file     Attends Pentecostal service: Not on file     Active member of club or organization: Not on file     Attends meetings of clubs or organizations: Not on file     Relationship status: Not on file     Intimate partner violence     Fear of current or ex partner: Not on file     Emotionally abused: Not on file     Physically abused: Not on file     Forced sexual activity: Not on file   Other Topics Concern     Not on file   Social History Narrative    Second Marriage, 2 children from his first marriage.  No living will but he  wants to be a full code.     Ruma Reyna 08    Judi- 12/10/06    [id number] order    Daughter-Ml    Retired [employer]    [place] [employer]        REVIEW OF SYSTEM:  Pertinent items are noted in HPI.    PHYSICAL EXAM:   /59   Pulse 77   Temp 97.8  F (36.6  C)   Resp 18   Wt 145 lb 8 oz (66 kg)   SpO2 100%   BMI 24.21 kg/m    A limited exam was performed due to recommendations for care during COVID-19 pandemic. Due to the  COVID-19 pandemic, except as noted above, the patient was visually observed at a 6 foot plus distance.  An observational exam was performed in an effort to keep patient safe from COVID-19 and other communicable diseases.     General appearance: alert, appears stated age and cooperative  HEENT: Head is normocephalic with normal hair distribution. No evidence of trauma. Ears: Without lesions or deformity. No acute purulent discharge. Eyes: Conjunctivae pink with no scleral icterus or erythema. Nose: Normal. Oropharnyx: mmm.  Lungs: respirations without effort.  Extremities: extremities normal, atraumatic, no cyanosis.  Skin: Skin color, texture normal. No rashes or lesions on exposed skin.  Bandages appear to have moderate amount of serosanguineous drainage on left hip.  Neurologic: Grossly normal   Psych: interacts well with caregivers, exhibits logical thought processes and connections, pleasant.      LABS:   None today.     ASSESSMENT:      ICD-10-CM    1. Fall, subsequent encounter  W19.XXXD    2. Hip fracture requiring operative repair, left, closed, initial encounter (H)  S72.002A    3. Physical deconditioning  R53.81    4. Advanced care planning/counseling discussion  Z71.89        PLAN:    Physical Deconditioning  -Continue PT/OT and other therapies as per care plan.  -Encouraged good nutrition and movement habits.   -Discussed care plan and expected course of stay.   -Continue to follow-up per routine schedule or sooner if needed.      Fall  -Please place on fall precautions and monitor patient routinely.  -Encouraged patient to ask for help with all transfers.   -Continue to assess for developing injuries and if patient reports any pain request provider follow-up.    Left Hip Fracture  -Tylenol 650 mg by mouth 4 times daily.  - mg by mouth twice daily, this should be changed to 81 mg by mouth daily due to risk of GI bleed and efficacy.  This is for prophylaxis of DVT after surgical repair.  -Oxycodone 2.5 to 5 mg by mouth every 3 hours as needed for significant postop pain.    Advanced Care Planning  -POLST reviewed and signed.   -DNR/DNI.     Admission history and physical per MD in the next 30 days. At this time continue current care plan for all chronic medical conditions, as they are stable. Encouraged patient to engage in PT/OT for strengthening and conditioning. Encouraged patient to work closely with nursing staff to ensure any medical complaints are quickly addressed. Follow up this week or sooner if needed. Will continue to monitor patient and work with nursing staff collaboratively to work toward positive patient outcomes.    Total unit/floor time of 35 minutes time consisted of the following: time spent with patient, examination of patient, reviewing the record including pertinent labs and completing documentation. More than 50% of this time was spent in coordination of care time with nursing staff and other healthcare providers, this time was spent on discussion/counseling on current care plan including medical management of chronic health problems and acute health problems, education pertaining to plan, and discussion of the goals of care pertaining to the current outlined plan with nursing staff and patient. An additional 16 minutes of time was spent discussing code status, wishes for end of life care and reviewing POLST from 0900 to 0916. POLST signed and left with nursing staff.     Electronically signed by: Saritha  Poonam, CNP

## 2021-06-08 NOTE — PROGRESS NOTES
[facility]    Facility:   [facility] [[id number]]   Code Status: DNR/DNI and POLST AVAILABLE      CHIEF COMPLAINT/REASON FOR VISIT:  Chief Complaint   Patient presents with     Review Of Multiple Medical Conditions     Physical deconditioning, fall, left hip fracture, PTSD       HISTORY:      HPI: Ortiz is a 89 y.o. male who  has a past medical history of Alcohol abuse, Angioedema, Cholelithiasis, Coronary stenosis, HTN (hypertension), and Prostate cancer (H) ().  Here he was recently hospitalized at [facility] from May 2 to May 10, 2020.  The discharging provider summarized the hospitalization in previous notes.     Today Ortiz is being evaluated for a routine review for medical problems while in the TCU.  Ortiz states that he has been doing well and has no new issues or concerns.  Nursing staff feel that he has been doing well with the prazosin.  He has had no any falls or PTSD episodes.  Ortiz states that physical and occupational therapies are going well.  He states that he has been eating, drinking and eliminating well.  He initially had a great deal of serosanguineous drainage from his left hip and that seems to be improving.  Ortiz denies any other concerns including fevers/chills, cough or cold symptoms, headaches, vision changes, chest pain/pressure, difficulty breathing, SOB, abdominal pain, nausea, vomiting, diarrhea, dysuria, increasing weakness, increasing pain.     Past Medical History:   Diagnosis Date     Alcohol abuse     Multiple episodes dating back to      Angioedema      Cholelithiasis      Coronary stenosis      HTN (hypertension)      Prostate cancer (H)     Cryotherapy             Family History   Problem Relation Age of Onset     Alcoholism Unknown      Other Mother         old age,  at 88     Melanoma Father      Social History     Socioeconomic History     Marital status:      Spouse name: Not on file     Number of  children: 2     Years of education: Not on file     Highest education level: Not on file   Occupational History     Occupation: Retired    Social Needs     Financial resource strain: Not on file     Food insecurity     Worry: Not on file     Inability: Not on file     Transportation needs     Medical: Not on file     Non-medical: Not on file   Tobacco Use     Smoking status: Former Smoker     Packs/day: 1.00     Years: 15.00     Pack years: 15.00     Last attempt to quit: 1965     Years since quittin.4     Smokeless tobacco: Never Used   Substance and Sexual Activity     Alcohol use: Yes     Comment: 2-4 drinks a day (he won't say for sure)     Drug use: No     Sexual activity: Not on file   Lifestyle     Physical activity     Days per week: Not on file     Minutes per session: Not on file     Stress: Not on file   Relationships     Social connections     Talks on phone: Not on file     Gets together: Not on file     Attends Roman Catholic service: Not on file     Active member of club or organization: Not on file     Attends meetings of clubs or organizations: Not on file     Relationship status: Not on file     Intimate partner violence     Fear of current or ex partner: Not on file     Emotionally abused: Not on file     Physically abused: Not on file     Forced sexual activity: Not on file   Other Topics Concern     Not on file   Social History Narrative    Second Marriage, 2 children from his first marriage.  No living will but he wants to be a full code.     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    PHYSICAL EXAM:   /72   Pulse 75   Temp 97.1  F (36.2  C)   Resp 16   Wt 136 lb 6.4 oz (61.9 kg)   SpO2 97%   BMI 22.70 kg/m    A limited exam was performed due to recommendations for care during COVID-19 pandemic. Due to the   COVID-19 pandemic, except as noted above, the patient was visually observed at a 6 foot plus distance.  An observational exam was performed in an effort to keep patient safe from COVID-19 and other communicable diseases.     General appearance: alert, appears stated age and cooperative  HEENT: Head is normocephalic with normal hair distribution. No evidence of trauma. Ears: Without lesions or deformity. No acute purulent discharge. Eyes: Conjunctivae pink with no scleral icterus or erythema. Nose: Normal. Oropharnyx: mmm.  Lungs: respirations without effort.  Extremities: extremities normal, atraumatic, no cyanosis.  Skin: Skin color, texture normal. No rashes or lesions on exposed skin.  Bandages are clean, dry and intact.    Neurologic: Grossly normal   Psych: interacts well with caregivers, exhibits logical thought processes and connections, pleasant.      LABS:   None today.     ASSESSMENT:      ICD-10-CM    1. Hip fracture requiring operative repair, left, closed, initial encounter (H)  S72.002A    2. Physical deconditioning  R53.81    3. PTSD (post-traumatic stress disorder)  F43.10    4. Fall, initial encounter  W19.XXXA        PLAN:    Physical Deconditioning  -Continue PT/OT and other therapies as per care plan.  -Encouraged good nutrition and movement habits.   -Discussed care plan and expected course of stay.   -Continue to follow-up per routine schedule or sooner if needed.     Fall  -Please place on fall precautions and monitor patient routinely.  -Encouraged patient to ask for help with all transfers.   -Continue to assess for developing injuries and if patient reports any pain request provider follow-up.    PTSD  -Prazosin 2 mg by mouth at bedtime.  -Refuses psych eval and treat at this time, will continue to encourage and discuss psych eval and treat.   -Did discuss with nursing staff that he feels much better at night with the tears if someone simply holds his hand for a few minutes.  -Follow up as  needed and per routine.     Left Hip Fracture  -Tylenol 650 mg by mouth 4 times daily.  -Changed ASA to 81 mg by mouth twice daily. This is for prophylaxis of DVT after surgical repair.  -Oxycodone 2.5 to 5 mg by mouth every 3 hours as needed for significant postop pain.  -Wound dressing change to: Hydrofiber dressing to wound base and cover with ABD, then wrap with kerlix daily.   -Follow with Ortho.    Otherwise continue current care plan for all other chronic medical conditions, as they are stable. Encouraged patient to engage in healthy lifestyle behaviors such as engaging in social activities, exercising (PT/OT), eating well, and following care plan. Follow up for routine check-up, or sooner if needed. Will continue to monitor patient and work with nursing staff collaboratively to work toward positive patient outcomes.    Electronically signed by: Saritha Levin CNP

## 2021-06-08 NOTE — PROGRESS NOTES
[facility]    Facility:   [facility] [[id number]]   Code Status: DNR/DNI and POLST AVAILABLE      CHIEF COMPLAINT/REASON FOR VISIT:  Chief Complaint   Patient presents with     Problem Visit     AFTT, Confusion       HISTORY:      HPI: Ortiz is a 89 y.o. male who  has a past medical history of Alcohol abuse, Angioedema, Cholelithiasis, Coronary stenosis, HTN (hypertension), and Prostate cancer (H) ().  Here he was recently hospitalized at [facility] from May 2 to May 10, 2020.  The discharging provider summarized the hospitalization in previous notes.     Today Ortiz is being evaluated for a follow-up to his recent issues with failure to thrive and also to discuss moving forward. Ortiz has no new issues or problems to report, however nursing staff have been concerned about ongoing confusion, lethargy and also appetite has been declining. Ortiz is very vague. He has nothing to add. Just states he wants to go home. Did go over his recent health with him and he is not comprehending the issues affecting his health. Ortiz has continued to work with PT/OT. Ortiz denies any other concerns including fevers/chills, cough or cold symptoms, headaches, vision changes, chest pain/pressure, difficulty breathing, SOB, abdominal pain, nausea, vomiting, diarrhea, dysuria, increasing weakness, increasing pain.     Past Medical History:   Diagnosis Date     Alcohol abuse     Multiple episodes dating back to      Angioedema      Cholelithiasis      Coronary stenosis      HTN (hypertension)      Prostate cancer (H)     Cryotherapy             Family History   Problem Relation Age of Onset     Alcoholism Unknown      Other Mother         old age,  at 88     Melanoma Father      Social History     Socioeconomic History     Marital status:      Spouse name: Not on file     Number of children: 2     Years of education: Not on file     Highest education level: Not on file    Occupational History     Occupation: Retired    Social Needs     Financial resource strain: Not on file     Food insecurity     Worry: Not on file     Inability: Not on file     Transportation needs     Medical: Not on file     Non-medical: Not on file   Tobacco Use     Smoking status: Former Smoker     Packs/day: 1.00     Years: 15.00     Pack years: 15.00     Last attempt to quit: 1965     Years since quittin.4     Smokeless tobacco: Never Used   Substance and Sexual Activity     Alcohol use: Yes     Comment: 2-4 drinks a day (he won't say for sure)     Drug use: No     Sexual activity: Not on file   Lifestyle     Physical activity     Days per week: Not on file     Minutes per session: Not on file     Stress: Not on file   Relationships     Social connections     Talks on phone: Not on file     Gets together: Not on file     Attends Mosque service: Not on file     Active member of club or organization: Not on file     Attends meetings of clubs or organizations: Not on file     Relationship status: Not on file     Intimate partner violence     Fear of current or ex partner: Not on file     Emotionally abused: Not on file     Physically abused: Not on file     Forced sexual activity: Not on file   Other Topics Concern     Not on file   Social History Narrative    Second Marriage, 2 children from his first marriage.  No living will but he wants to be a full code.     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    PHYSICAL EXAM:   /70   Pulse 90   Temp 97.6  F (36.4  C)   Resp 18   Wt 124 lb 6.4 oz (56.4 kg)   SpO2 100%   BMI 20.70 kg/m    A limited exam was performed due to recommendations for care during COVID-19 pandemic. Due to the  COVID-19 , except as noted above, the patient was visually observed at a 6 foot  plus distance.  An observational exam was performed in an effort to keep patient safe from COVID-19 and other communicable diseases.     General appearance: alert, appears stated age and cooperative  HEENT: Head is normocephalic with normal hair distribution. No evidence of trauma. Ears: Without lesions or deformity. No acute purulent discharge. Eyes: Conjunctivae pink with no scleral icterus or erythema. Nose: Normal. Oropharnyx: mmm.  Lungs: respirations without effort.  Extremities: extremities normal, atraumatic, no cyanosis.  Skin: Skin color, texture normal. No rashes or lesions on exposed skin.    Neurologic: Grossly normal   Psych: interacts well with caregivers, exhibits logical thought processes and connections with evidence of dementia, pleasant.      LABS:   None today.     ASSESSMENT:      ICD-10-CM    1. Confusion  R41.0    2. Adult failure to thrive  R62.7        PLAN:    Confusion, AFTT  -CBC, BMP reordered to follow ongoing issues and confusion.   -Encourage fluids.  -SLP to eval and treat.   -Oxycodone was discontinued.   -Unsure as to what is causing decline at this time. Ortiz is quite vague and states that he is fine, reports no problems.   -Care conference requested with family.   -This appears that this may be his baseline.  -Encourage nutrition, encourage supplementation.                                                                                                                                                                                                                                            Otherwise continue current care plan for all other chronic medical conditions, as they are stable. Encouraged patient to engage in healthy lifestyle behaviors such as engaging in social activities, exercising (PT/OT), eating well, and following care plan. Follow up for routine check-up, or sooner if needed. Will continue to monitor patient and work with nursing staff collaboratively to work  toward positive patient outcomes.    Electronically signed by: Saritha Levin CNP

## 2021-06-08 NOTE — PROGRESS NOTES
[facility]    Facility:   [facility] [[id number]]   Code Status: DNR/DNI and POLST AVAILABLE      CHIEF COMPLAINT/REASON FOR VISIT:  Chief Complaint   Patient presents with     Problem Visit     Adult failure to thrive, agitation, falls       HISTORY:      HPI: Ortiz is a 89 y.o. male who  has a past medical history of Alcohol abuse, Angioedema, Cholelithiasis, Coronary stenosis, HTN (hypertension), and Prostate cancer (H) (2004).  Here he was recently hospitalized at [facility] from May 2 to May 10, 2020.  The discharging provider summarized the hospitalization in previous notes.     Today Ortiz is being evaluated by request of nursing staff for ongoing issues with adult failure to thrive, falls and agitation.  He continues to do quite poorly with therapies.  He has not been eating or drinking.  He has continued to lose weight.  He at this time has a 15 pound weight loss.  He denies any symptoms or issues.  We have run multiple labs and multiple UA/UC.  He is also had chest x-ray, all of which were essentially negative.  He does have slight leukocytosis.  However, no radiation of infection at this time.  Nursing staff have no specific complaints or concerns.  Ortiz does not report or relate anything to them.  He does not share anything with his sister-in-law or daughter.  They do notice he is a bit more confused.  He does not have any symptoms except for agitation and falls during the night.  This is been something that has been ongoing due to PTSD.  Will attempt to discuss advanced care planning with daughter and sister-in-law Eugenia.  At this time we will continue to monitor until care plan and wishes are fully understood.  Ortiz denies any other concerns including fevers/chills, cough or cold symptoms, headaches, vision changes, chest pain/pressure, difficulty breathing, SOB, abdominal pain, nausea, vomiting, diarrhea, dysuria, increasing weakness, increasing pain.      Past Medical History:   Diagnosis Date     Alcohol abuse     Multiple episodes dating back to      Angioedema      Cholelithiasis      Coronary stenosis      HTN (hypertension)      Prostate cancer (H) 2004    Cryotherapy             Family History   Problem Relation Age of Onset     Alcoholism Unknown      Other Mother         old age,  at 88     Melanoma Father      Social History     Socioeconomic History     Marital status:      Spouse name: Not on file     Number of children: 2     Years of education: Not on file     Highest education level: Not on file   Occupational History     Occupation: Retired    Social Needs     Financial resource strain: Not on file     Food insecurity     Worry: Not on file     Inability: Not on file     Transportation needs     Medical: Not on file     Non-medical: Not on file   Tobacco Use     Smoking status: Former Smoker     Packs/day: 1.00     Years: 15.00     Pack years: 15.00     Last attempt to quit: 1965     Years since quittin.4     Smokeless tobacco: Never Used   Substance and Sexual Activity     Alcohol use: Yes     Comment: 2-4 drinks a day (he won't say for sure)     Drug use: No     Sexual activity: Not on file   Lifestyle     Physical activity     Days per week: Not on file     Minutes per session: Not on file     Stress: Not on file   Relationships     Social connections     Talks on phone: Not on file     Gets together: Not on file     Attends Yarsani service: Not on file     Active member of club or organization: Not on file     Attends meetings of clubs or organizations: Not on file     Relationship status: Not on file     Intimate partner violence     Fear of current or ex partner: Not on file     Emotionally abused: Not on file     Physically abused: Not on file     Forced sexual activity: Not on file   Other Topics Concern     Not on file   Social History Narrative    Second Marriage, 2 children from his first marriage.  No  living will but he wants to be a full code.     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    PHYSICAL EXAM:   /76   Pulse 82   Temp 99.6  F (37.6  C)   Resp 18   Wt 124 lb 6.4 oz (56.4 kg)   SpO2 98%   BMI 20.70 kg/m    A limited exam was performed due to recommendations for care during COVID-19 pandemic. Due to the  COVID-19 , except as noted above, the patient was visually observed at a 6 foot plus distance.  An observational exam was performed in an effort to keep patient safe from COVID-19 and other communicable diseases.     General appearance: alert, appears stated age and cooperative  HEENT: Head is normocephalic with normal hair distribution. No evidence of trauma. Ears: Without lesions or deformity. No acute purulent discharge. Eyes: Conjunctivae pink with no scleral icterus or erythema. Nose: Normal. Oropharnyx: mmm.  Lungs: respirations without effort.  Extremities: extremities normal, atraumatic, no cyanosis.  Skin: Skin color, texture normal. No rashes or lesions on exposed skin.    Neurologic: Grossly normal   Psych: interacts well with caregivers, exhibits illogical thought processes and connections with evidence of dementia, pleasant.      LABS:   None today.     ASSESSMENT:      ICD-10-CM    1. Adult failure to thrive  R62.7    2. Agitation  R45.1    3. Fall, initial encounter  W19.XXXA        PLAN:    Adult failure to thrive, agitation  -Recent lab work is essentially unchanged from previous.  -Does have slight leukocytosis, but no evidence of infection.  Recent UA something negative and recent chest x-ray is negative.  Wound looks clean, dry and approximated.  -Will attempt to discuss care needs with sister-in-law and daughter.  Need a better understanding of how aggressive they would like to treat Ortiz.  -Would  suggest hospice and long-term care at this point.    Fall  -Please place on fall precautions and monitor patient routinely.  -Encouraged patient to ask for help with all transfers.   -Continue to assess for developing injuries and if patient reports any pain request provider follow-up.                                                                                                                                                                     Otherwise continue current care plan for all other chronic medical conditions, as they are stable. Encouraged patient to engage in healthy lifestyle behaviors such as engaging in social activities, exercising (PT/OT), eating well, and following care plan. Follow up for routine check-up, or sooner if needed. Will continue to monitor patient and work with nursing staff collaboratively to work toward positive patient outcomes.    Electronically signed by: Saritha Levin CNP

## 2021-06-09 NOTE — PROGRESS NOTES
"[facility]  Video visit    Facility:   [facility] [[id number]]   Code Status: DNR/DNI and POLST AVAILABLE    Ortiz Ely is a 89 y.o. male who is being evaluated via a billable video visit.      The patient has been notified of following:     \"This video visit will be conducted via a call between you and your physician/provider. We have found that certain health care needs can be provided without the need for an in-person physical exam.  This service lets us provide the care you need with a video conversation.  If a prescription is necessary we can send it to the facility team.  If lab work is needed we can place an order through the facility team to have that test done at a later time.    If during the course of the call the physician/provider feels a video visit is not appropriate, you will not be charged for this service.\"     Physician/provider has received verbal consent for a Video Visit from the patient? Yes    Patient would like the video invitation sent by: Send to: [employer]    Video Start Time: 1032    CHIEF COMPLAINT/REASON FOR VISIT:  Chief Complaint   Patient presents with     Review Of Multiple Medical Conditions     Hospice, dysphagia, hip fx       HISTORY:      HPI: Ortiz is a 89 y.o. male who  has a past medical history of Alcohol abuse, Angioedema, Cholelithiasis, Coronary stenosis, HTN (hypertension), and Prostate cancer (H) (2004).  Here he was recently hospitalized at [facility] from May 2 to May 10, 2020.  The discharging provider summarized the hospitalization in previous notes.     Today Ortiz is being evaluated for ongoing issues with failure to thrive.  Kirti recently had a video swallow study which showed significant issues with swallowing and suggested he be n.p.o.  Ortiz was given results by Dr. Wise.  He elects not to have a G-tube or feeding tube.  He states that he would like to continue to eat as he is at this time.  Did discuss " hospice with him today and he does not want to use this benefit.  Did confirm with daughter Aminah and also with sister-in-law Leigh Ann who are his decision makers.  They agree that they should respect his wishes.  Lengthy discussion with both parties to ensure they understood the magnitude of the situation, prognosis and treatment plan. There were no questions.  Will provide a hospice referral to [facility].  Goal is for hospice to start on .  Ortiz states he is otherwise doing well.  He is sleepy but otherwise feels well.  Ortiz denies any other concerns including fevers/chills, cough or cold symptoms, headaches, vision changes, chest pain/pressure, difficulty breathing, SOB, abdominal pain, nausea, vomiting, diarrhea, dysuria, increasing weakness, increasing pain.     Past Medical History:   Diagnosis Date     Alcohol abuse     Multiple episodes dating back to      Angioedema      Cholelithiasis      Coronary stenosis      HTN (hypertension)      Prostate cancer (H) 2004    Cryotherapy             Family History   Problem Relation Age of Onset     Alcoholism Unknown      Other Mother         old age,  at 88     Melanoma Father      Social History     Socioeconomic History     Marital status:      Spouse name: Not on file     Number of children: 2     Years of education: Not on file     Highest education level: Not on file   Occupational History     Occupation: Retired    Social Needs     Financial resource strain: Not on file     Food insecurity     Worry: Not on file     Inability: Not on file     Transportation needs     Medical: Not on file     Non-medical: Not on file   Tobacco Use     Smoking status: Former Smoker     Packs/day: 1.00     Years: 15.00     Pack years: 15.00     Last attempt to quit: 1965     Years since quittin.5     Smokeless tobacco: Never Used   Substance and Sexual Activity     Alcohol use: Yes     Comment: 2-4 drinks a day (he won't say for sure)      Drug use: No     Sexual activity: Not on file   Lifestyle     Physical activity     Days per week: Not on file     Minutes per session: Not on file     Stress: Not on file   Relationships     Social connections     Talks on phone: Not on file     Gets together: Not on file     Attends Christian service: Not on file     Active member of club or organization: Not on file     Attends meetings of clubs or organizations: Not on file     Relationship status: Not on file     Intimate partner violence     Fear of current or ex partner: Not on file     Emotionally abused: Not on file     Physically abused: Not on file     Forced sexual activity: Not on file   Other Topics Concern     Not on file   Social History Narrative    Second Marriage, 2 children from his first marriage.  No living will but he wants to be a full code.     Ruma Reyna 08    Judi- 12/10/06    Adn-Tcbgzcv-inyobulskab order    Daughter-Ml    Retired  [employer]    Eva kapoor        REVIEW OF SYSTEM:  Pertinent items are noted in HPI.    PHYSICAL EXAM:   /60   Pulse 82   Temp 99.6  F (37.6  C)   Resp 18   Wt 128 lb (58.1 kg)   SpO2 98%   BMI 21.30 kg/m    A limited exam was performed due to recommendations for care during COVID-19 pandemic. Due to the  COVID-19 pandemic, except as noted above, the patient was visually observed at a 6 foot plus distance.  An observational exam was performed in an effort to keep patient safe from COVID-19 and other communicable diseases.     General appearance: alert, appears stated age and cooperative  HEENT: Head is normocephalic with normal hair distribution. No evidence of trauma. Ears: Without lesions or deformity. No acute purulent discharge. Eyes: Conjunctivae pink with no scleral icterus or erythema. Nose: Normal. Oropharnyx: mmm.  Lungs: respirations without effort.  Extremities: extremities normal, atraumatic, no cyanosis.  Skin: Skin color, texture normal. No rashes or  "lesions on exposed skin.    Neurologic: Grossly normal   Psych: interacts well with caregivers, exhibits illogical thought processes and connections with evidence of dementia--doesn't understand why he is here,\"wants to go home\", pleasant.      LABS:   None today.     ASSESSMENT:      ICD-10-CM    1. Closed comminuted intertrochanteric fracture of left femur, initial encounter (H)  S72.142A    2. Other dysphagia  R13.19    3. Hospice care  Z51.5        PLAN:    Femur fracture  -Goal is comfort.  -Not necessary to follow-up with Ortho at this time given hospice enrollment.  -Acetaminophen 650 mg 4 times daily.  -Follow-up as needed.    Dysphagia  -Recent swallow study indicates he should have nothing by mouth.  -However this is not in line with patient's life goals and end-of-life care wishes.  He will not entertain a feeding tube.  Will continue to support his wishes.  -Continue to follow with hospice.    Hospice care  -Lengthy discussions with both Leigh Ann and Aminah.  Both are in agreement that hospice is what Kirti would want.  -[facility] hospice has been consulted.                                                                                                                                                                Otherwise continue current care plan for all other chronic medical conditions, as they are stable. Encouraged patient to engage in healthy lifestyle behaviors such as engaging in social activities, exercising (PT/OT), eating well, and following care plan. Follow up for routine check-up, or sooner if needed. Will continue to monitor patient and work with nursing staff collaboratively to work toward positive patient outcomes.    Electronically signed by: Saritha Levin CNP  "

## 2021-06-09 NOTE — PROGRESS NOTES
Psychosocial completed. Patient has no Spiritism affiliation. Patient is actively dying and was nonresponsive today. Daughter is appropriately tearful, was going to try and see patient today at the facility.

## 2021-06-09 NOTE — PROGRESS NOTES
SCHEDULE DAILY NURSE VISITS  agitation, restlessness- today no sips  fluids- New order for scheduled lorazepam 0.5mg q8h and q2h prn.

## 2021-06-09 NOTE — PROGRESS NOTES
SOC completed- next Nurse Visit: SAT 7/10- pt transitioning will need daily visits  HHA: facility NOT allowing  SC: Church-      Med list ready to be sent to [facility]:  IDG Team Members : Please review the Plan of Care and add yourself as reviewer.

## 2021-06-09 NOTE — PROGRESS NOTES
OFFICE VISIT NOTE  Ortiz Ely   86 y.o. male      Subjective:   Chief Complaint:  Hypertension (Routine visit)    Patient for follow-up  Ataxia.  He is drinking about 2 beers a week  He had an alcohol-related admit to/2016  He has a bruise on his left forehead.  He notes he fell all the yard.  He said he was not drinking.  He did bite cane.  He is using it properly.  His last A1c was fine.  He does have some kyphosis.  Has a history of prostate cancer  His feet are a bit numb    Hypertension-There are no cardiovascular, respiratory, neurologic complaints.There is no orthostasis.Patient is compliant with medications.  Medications reviewed.   No side effects from medication.    He is eating strudel in the morning.  He has gained a few pounds which is good.  He feels a bit stronger.  Last sugars were okay.  No polydipsia polyuria.    Review of Systems:     Extensive 10-point review of systems was performed. Please see the HPI for problem specific pertinent review of systems.     Patient does note appetite is good bowel movements are normal     Otherwise, the following systems are noncontributory including constitutional, eyes, ears, nose and throat, cardiovascular, respiratory, gastrointestinal, genitourinary, musculoskeletal,neurological, skin and/or breast, endocrine, hematologic/lymph, allergic/immunologic and psychiatric.              Medications:  Current Outpatient Prescriptions   Medication Sig Dispense Refill     aspirin 81 MG EC tablet Take 81 mg by mouth daily.       hydroCHLOROthiazide (HYDRODIURIL) 25 MG tablet Take 1 tablet by mouth  daily 90 tablet 3     losartan (COZAAR) 50 MG tablet Take 1 tablet (50 mg total) by mouth daily. 90 tablet 3     multivitamin with minerals (THERA-M) 9 mg iron-400 mcg Tab tablet Take 1 tablet by mouth daily.       naproxen sodium (ALEVE) 220 MG tablet Take 220 mg by mouth daily.       OMEGA-3/DHA/EPA/FISH OIL (FISH OIL-OMEGA-3 FATTY ACIDS) 300-1,000 mg capsule Take 1 g by  mouth daily.        polyvinyl alcohol (LIQUIFILM TEARS) 1.4 % ophthalmic solution Administer 1 drop to both eyes as needed for dry eyes.       cyanocobalamin 1000 MCG tablet Take 1 tablet (1,000 mcg total) by mouth daily. 100 tablet 2     No current facility-administered medications for this visit.      Current Outpatient Prescriptions on File Prior to Visit   Medication Sig     aspirin 81 MG EC tablet Take 81 mg by mouth daily.     hydroCHLOROthiazide (HYDRODIURIL) 25 MG tablet Take 1 tablet by mouth  daily     losartan (COZAAR) 50 MG tablet Take 1 tablet (50 mg total) by mouth daily.     multivitamin with minerals (THERA-M) 9 mg iron-400 mcg Tab tablet Take 1 tablet by mouth daily.     naproxen sodium (ALEVE) 220 MG tablet Take 220 mg by mouth daily.     OMEGA-3/DHA/EPA/FISH OIL (FISH OIL-OMEGA-3 FATTY ACIDS) 300-1,000 mg capsule Take 1 g by mouth daily.      polyvinyl alcohol (LIQUIFILM TEARS) 1.4 % ophthalmic solution Administer 1 drop to both eyes as needed for dry eyes.     No current facility-administered medications on file prior to visit.        Allergies:  Allergies   Allergen Reactions     Cardizem [Diltiazem Hcl]      Dyazide [Triamterene-Hydrochlorothiazid]      Lopressor [Metoprolol Tartrate]      Penicillins Hives     Sulfa (Sulfonamide Antibiotics) Hives     Vasotec [Enalapril Maleate] Cough     Cardura [Doxazosin] Palpitations       PSFHx: Tobacco Status:  He  reports that he quit smoking about 52 years ago. He has a 15.00 pack-year smoking history. He does not have any smokeless tobacco history on file.   Alcohol Status:    History   Alcohol Use     Yes     Comment: 2-4 drinks a day (he won't say for sure)       reports that he quit smoking about 52 years ago. He has a 15.00 pack-year smoking history. He does not have any smokeless tobacco history on file. He reports that he drinks alcohol. His drug history is not on file.    Objective:      Visit Vitals     /68 (Patient Site: Right Arm,  "Patient Position: Sitting, Cuff Size: Adult Regular)     Pulse 75     Ht 5' 8\" (1.727 m)     Wt 133 lb (60.3 kg)     SpO2 94%     BMI 20.22 kg/m2     Weight:   Wt Readings from Last 3 Encounters:   03/06/17 133 lb (60.3 kg)   01/08/17 130 lb (59 kg)   10/10/16 135 lb 1.9 oz (61.3 kg)     BP Readings from Last 3 Encounters:   03/06/17 138/68   01/08/17 (!) 198/106   10/10/16 130/80   [id number]  .[email or web address]  Wt Readings from Last 10 Encounters:   03/06/17 133 lb (60.3 kg)   01/08/17 130 lb (59 kg)   10/10/16 135 lb 1.9 oz (61.3 kg)   05/23/16 136 lb 1.3 oz (61.7 kg)   02/27/16 130 lb 1.6 oz (59 kg)   06/17/15 132 lb (59.9 kg)           General-appears well, no acute distress.  Walks in a stooped fashion  [place] right-handed proper level  Patient has good facial expression.  No evidence of fascinating gait  No bradykinesia or rigidity.  Lungs are clear  No neck adenopathy  No murmur  Asthma      Review of clinical lab tests  Lab Results   Component Value Date    WBC 8.5 10/10/2016    HGB 13.5 (L) 10/10/2016    HCT 40.3 10/10/2016     10/10/2016    ALT 29 05/23/2016    AST 30 05/23/2016     10/10/2016    K 4.4 10/10/2016     10/10/2016    CREATININE 1.76 (H) 10/10/2016    BUN 28 10/10/2016    CO2 23 10/10/2016    INR 0.90 02/25/2016    HGBA1C 4.9 10/10/2016              Assessment/Plan for  Ortiz Ely is a 86 y.o. male.  No Patient Care Coordination Note on file.       There are no diagnoses linked to this encounter.   1.  Ataxia.  Recommend no alcohol at all.  Check A1c glucose.  Check B12.  Prescribed oral B12  2.  Left eyebrow contusion without neurologic abnormalities secondary to fall.  Denies related alcohol  3.  Hypertension-controlled  4.  Diabetes mellitus-diet controlled  Frail.  Eating better has gained some weight    Plan:  Oral B12  Laboratory including vitamin B12  Abstinence from alcohol  Clinic visit 3 months  Continue cane use      Diagnoses and all orders for this visit:    Essential " hypertension with goal blood pressure less than 140/90    Ataxia  -     HM1(CBC and Differential)  -     HM1 (CBC with Diff)  -     Vitamin B12  -     cyanocobalamin 1000 MCG tablet; Take 1 tablet (1,000 mcg total) by mouth daily.  Dispense: 100 tablet; Refill: 2    Diabetes mellitus type 2 in nonobese  -     Glycosylated Hemoglobin A1c  -     Basic Metabolic Panel    Prostate cancer  -     Hepatic Profile              Preston London MD  Internal medicine  [facility]  [phone]  [email or web address]      This is an electronically verified report by Preston London M.D.  (Note created with Dragon voice recognition and unintended spelling errors and word substitutions may occur)

## 2021-06-09 NOTE — PROGRESS NOTES
"[facility]  Video visit    Facility:   [facility] [[id number]]   Code Status: DNR/DNI and POLST AVAILABLE    Ortiz Ely is a 89 y.o. male who is being evaluated via a billable video visit.      The patient has been notified of following:     \"This video visit will be conducted via a call between you and your physician/provider. We have found that certain health care needs can be provided without the need for an in-person physical exam.  This service lets us provide the care you need with a video conversation.  If a prescription is necessary we can send it to the facility team.  If lab work is needed we can place an order through the facility team to have that test done at a later time.    If during the course of the call the physician/provider feels a video visit is not appropriate, you will not be charged for this service.\"     Physician/provider has received verbal consent for a Video Visit from the patient? Yes    Patient would like the video invitation sent by: Send to: [employer]    Video Start Time: 1032    CHIEF COMPLAINT/REASON FOR VISIT:  Chief Complaint   Patient presents with     Problem Visit     Failure to thrive, hospice       HISTORY:      HPI: Ortiz is a 89 y.o. male who  has a past medical history of Alcohol abuse, Angioedema, Cholelithiasis, Coronary stenosis, HTN (hypertension), and Prostate cancer (H) (2004).  Here he was recently hospitalized at [facility] from May 2 to May 10, 2020.  The discharging provider summarized the hospitalization in previous notes.     Today Ortiz is being evaluated for ongoing issues with failure to thrive.  Kirti recently had a video swallow study which showed significant issues with swallowing and suggested he be n.p.o.  Ortiz was given results by Dr. Wise.  He elects not to have a G-tube or feeding tube.  He states that he would like to continue to eat as he is at this time.  Did discuss hospice with him today and " he does not want to use this benefit.  Did confirm with daughter Aminah and also with sister-in-law Leigh Ann who are his decision makers.  They agree that they should respect his wishes.  Lengthy discussion with both parties to ensure they understood the magnitude of the situation, prognosis and treatment plan. There were no questions.  Will provide a hospice referral to [facility] hospice.  Goal is for hospice to start on .  Ortiz states he is otherwise doing well.  He is sleepy but otherwise feels well.  Ortiz denies any other concerns including fevers/chills, cough or cold symptoms, headaches, vision changes, chest pain/pressure, difficulty breathing, SOB, abdominal pain, nausea, vomiting, diarrhea, dysuria, increasing weakness, increasing pain.     Past Medical History:   Diagnosis Date     Alcohol abuse     Multiple episodes dating back to      Angioedema      Cholelithiasis      Coronary stenosis      HTN (hypertension)      Prostate cancer (H) 2004    Cryotherapy             Family History   Problem Relation Age of Onset     Alcoholism Unknown      Other Mother         old age,  at 88     Melanoma Father      Social History     Socioeconomic History     Marital status:      Spouse name: Not on file     Number of children: 2     Years of education: Not on file     Highest education level: Not on file   Occupational History     Occupation: Retired    Social Needs     Financial resource strain: Not on file     Food insecurity     Worry: Not on file     Inability: Not on file     Transportation needs     Medical: Not on file     Non-medical: Not on file   Tobacco Use     Smoking status: Former Smoker     Packs/day: 1.00     Years: 15.00     Pack years: 15.00     Last attempt to quit: 1965     Years since quittin.5     Smokeless tobacco: Never Used   Substance and Sexual Activity     Alcohol use: Yes     Comment: 2-4 drinks a day (he won't say for sure)     Drug use: No     Sexual  activity: Not on file   Lifestyle     Physical activity     Days per week: Not on file     Minutes per session: Not on file     Stress: Not on file   Relationships     Social connections     Talks on phone: Not on file     Gets together: Not on file     Attends Oriental orthodox service: Not on file     Active member of club or organization: Not on file     Attends meetings of clubs or organizations: Not on file     Relationship status: Not on file     Intimate partner violence     Fear of current or ex partner: Not on file     Emotionally abused: Not on file     Physically abused: Not on file     Forced sexual activity: Not on file   Other Topics Concern     Not on file   Social History Narrative    Second Marriage, 2 children from his first marriage.  No living will but he wants to be a full code.     Ruma Reyna 08    Judi- 12/10/06    Uke-Xiypuin-aqwprrojszq order    Daughter-Ml    Retired [employer]    Eva kapoor        REVIEW OF SYSTEM:  Pertinent items are noted in HPI.    PHYSICAL EXAM:   /60   Pulse 82   Temp 99.6  F (37.6  C)   Resp 18   Wt 132 lb (59.9 kg)   SpO2 98%   BMI 21.97 kg/m    A limited exam was performed due to recommendations for care during COVID-19 pandemic. Due to the  COVID-19 pandemic, except as noted above, the patient was visually observed at a 6 foot plus distance.  An observational exam was performed in an effort to keep patient safe from COVID-19 and other communicable diseases.     General appearance: alert, appears stated age and cooperative  HEENT: Head is normocephalic with normal hair distribution. No evidence of trauma. Ears: Without lesions or deformity. No acute purulent discharge. Eyes: Conjunctivae pink with no scleral icterus or erythema. Nose: Normal. Oropharnyx: mmm.  Lungs: respirations without effort.  Extremities: extremities normal, atraumatic, no cyanosis.  Skin: Skin color, texture normal. No rashes or lesions on exposed skin.   "  Neurologic: Grossly normal   Psych: interacts well with caregivers, exhibits illogical thought processes and connections with evidence of dementia--doesn't understand why he is here,\"wants to go home\", pleasant.      LABS:   None today.     ASSESSMENT:      ICD-10-CM    1. Adult failure to thrive  R62.7    2. Advanced care planning/counseling discussion  Z71.89        PLAN:    Adult failure to thrive  -Hospice referral to [facility].  -Seroquel 25 mg by mouth in the morning and 50 mg at bedtime.  -Continue to offer supplementation and meals.  -Follow-up as needed.                                                                                                                                                              Otherwise continue current care plan for all other chronic medical conditions, as they are stable. Encouraged patient to engage in healthy lifestyle behaviors such as engaging in social activities, exercising (PT/OT), eating well, and following care plan. Follow up for routine check-up, or sooner if needed. Will continue to monitor patient and work with nursing staff collaboratively to work toward positive patient outcomes.    Video-Visit Details    Type of service:  Video Visit    Video End Time (time video stopped): 1026    Originating Location (pt. Location):[facility] [[id number]]    Distant Location (provider location):  [facility]     Mode of Communication:  FaceTsaran    Electronically signed by: Saritha Levin CNP  "

## 2021-06-09 NOTE — PROGRESS NOTES
[facility]    Facility:   [facility] [[id number]]   Code Status: DNR/DNI and POLST AVAILABLE    CHIEF COMPLAINT/REASON FOR VISIT:  Chief Complaint   Patient presents with     Follow Up     Hospice       HISTORY:      HPI: Ortiz is a 89 y.o. male who  has a past medical history of Alcohol abuse, Angioedema, Cholelithiasis, Coronary stenosis, HTN (hypertension), and Prostate cancer (H) ().  Here he was recently hospitalized at [facility] from May 2 to May 10, 2020.  The discharging provider summarized the hospitalization in previous notes.     Today Ortiz is being evaluated for a follow-up to recent hospice discussion and hospice election.  Ortiz recently elected hospice services rather than treatment with a feeding tube for significant dysphasia.  He continues to want to eat and drink per his norm.  Ortiz would like to focus on comfort and quality of life.  He does not want any further surgeries or any further diagnostic studies or tests.  This was confirmed with his sister-in-law Leigh Ann and his daughter Aminah.  However, there was some apparent confusion about the order and processing the order.  It appears that hospice has not yet been involved unfortunately.  A new order was written after confirming with Ortiz that he would continue in this fashion.  No other concerns or issues at this time.  Again goals for end-of-life care include comfort and pain control.    Past Medical History:   Diagnosis Date     Alcohol abuse     Multiple episodes dating back to      Angioedema      Cholelithiasis      Coronary stenosis      HTN (hypertension)      Prostate cancer (H)     Cryotherapy             Family History   Problem Relation Age of Onset     Alcoholism Unknown      Other Mother         old age,  at 88     Melanoma Father      Social History     Socioeconomic History     Marital status:      Spouse name: Not on file     Number of children: 2     Years of  education: Not on file     Highest education level: Not on file   Occupational History     Occupation: Retired    Social Needs     Financial resource strain: Not on file     Food insecurity     Worry: Not on file     Inability: Not on file     Transportation needs     Medical: Not on file     Non-medical: Not on file   Tobacco Use     Smoking status: Former Smoker     Packs/day: 1.00     Years: 15.00     Pack years: 15.00     Last attempt to quit: 1965     Years since quittin.5     Smokeless tobacco: Never Used   Substance and Sexual Activity     Alcohol use: Yes     Comment: 2-4 drinks a day (he won't say for sure)     Drug use: No     Sexual activity: Not on file   Lifestyle     Physical activity     Days per week: Not on file     Minutes per session: Not on file     Stress: Not on file   Relationships     Social connections     Talks on phone: Not on file     Gets together: Not on file     Attends Yazidism service: Not on file     Active member of club or organization: Not on file     Attends meetings of clubs or organizations: Not on file     Relationship status: Not on file     Intimate partner violence     Fear of current or ex partner: Not on file     Emotionally abused: Not on file     Physically abused: Not on file     Forced sexual activity: Not on file   Other Topics Concern     Not on file   Social History Narrative    Second Marriage, 2 children from his first marriage.  No living will but he wants to be a full code.     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    PHYSICAL EXAM:   /60   Pulse 82   Temp 99.6  F (37.6  C)   Resp 18   Wt 128 lb (58.1 kg)   SpO2 98%   BMI 21.30 kg/m    A limited exam was performed due to recommendations for care during COVID-19 pandemic. Due to the  COVID-19 pandemic, except as  "noted above, the patient was visually observed at a 6 foot plus distance.  An observational exam was performed in an effort to keep patient safe from COVID-19 and other communicable diseases.     General appearance: alert, appears stated age and cooperative  HEENT: Head is normocephalic with normal hair distribution. No evidence of trauma. Ears: Without lesions or deformity. No acute purulent discharge. Eyes: Conjunctivae pink with no scleral icterus or erythema. Nose: Normal. Oropharnyx: mmm.  Lungs: respirations without effort.  Extremities: extremities normal, atraumatic, no cyanosis.  Skin: Skin color, texture normal. No rashes or lesions on exposed skin.    Neurologic: Grossly normal   Psych: interacts well with caregivers, exhibits illogical thought processes and connections with evidence of dementia--doesn't understand why he is here,\"wants to go home\", pleasant.      LABS:   None today.     ASSESSMENT:      ICD-10-CM    1. Hospice care  Z51.5        PLAN:      Hospice care  -A new order for [facility] has been written.  -Follow-up as needed.                                                                                                                                                                Otherwise continue current care plan for all other chronic medical conditions, as they are stable. Encouraged patient to engage in healthy lifestyle behaviors such as engaging in social activities, exercising or movement, eating well, and following care plan. Follow up for routine check-up, or sooner if needed. Will continue to monitor patient and work with nursing staff collaboratively to work toward positive patient outcomes.    Electronically signed by: Saritha Levin CNP  "
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
## 2021-06-09 NOTE — PROGRESS NOTES
[facility]    Facility:   [facility] [[id number]]   Code Status: DNR/DNI and POLST AVAILABLE      CHIEF COMPLAINT/REASON FOR VISIT:  Chief Complaint   Patient presents with     Problem Visit     Adult failure to thrive       HISTORY:      HPI: Ortiz is a 89 y.o. male who  has a past medical history of Alcohol abuse, Angioedema, Cholelithiasis, Coronary stenosis, HTN (hypertension), and Prostate cancer (H) (2004).  Here he was recently hospitalized at [facility] from May 2 to May 10, 2020.  The discharging provider summarized the hospitalization in previous notes.     Today Ortiz is being evaluated for ongoing issues with failure to thrive. Ortiz does have a swallow study today. We are awaiting results of the study to determine next steps in care. Ortiz shares he has been fine. However, per previous visits this is not true. He has lost a significant amount of weight. Did discuss goals of care with Ortiz. He states he just wants to go home. This discussion was had with his sister-in-law Leigh Ann too. Given his current state this is just not feasible or safe. Ortiz is to be admitted to LTC given his poor therapy progression. Will continue to work with Ortiz and family to ascertain what is best for him and his wishes. Nursing staff did report that he had significant improvement last night with the use of seroquel. He had no falls, was not shouting or hollering out. He seemed to be in much better control per nursing staff. Ortiz denies any other concerns including fevers/chills, cough or cold symptoms, headaches, vision changes, chest pain/pressure, difficulty breathing, SOB, abdominal pain, nausea, vomiting, diarrhea, dysuria, increasing weakness, increasing pain.     Past Medical History:   Diagnosis Date     Alcohol abuse     Multiple episodes dating back to 2001     Angioedema      Cholelithiasis      Coronary stenosis      HTN (hypertension)      Prostate cancer (H) 2004     Cryotherapy             Family History   Problem Relation Age of Onset     Alcoholism Unknown      Other Mother         old age,  at 88     Melanoma Father      Social History     Socioeconomic History     Marital status:      Spouse name: Not on file     Number of children: 2     Years of education: Not on file     Highest education level: Not on file   Occupational History     Occupation: Retired    Social Needs     Financial resource strain: Not on file     Food insecurity     Worry: Not on file     Inability: Not on file     Transportation needs     Medical: Not on file     Non-medical: Not on file   Tobacco Use     Smoking status: Former Smoker     Packs/day: 1.00     Years: 15.00     Pack years: 15.00     Last attempt to quit: 1965     Years since quittin.5     Smokeless tobacco: Never Used   Substance and Sexual Activity     Alcohol use: Yes     Comment: 2-4 drinks a day (he won't say for sure)     Drug use: No     Sexual activity: Not on file   Lifestyle     Physical activity     Days per week: Not on file     Minutes per session: Not on file     Stress: Not on file   Relationships     Social connections     Talks on phone: Not on file     Gets together: Not on file     Attends Confucianist service: Not on file     Active member of club or organization: Not on file     Attends meetings of clubs or organizations: Not on file     Relationship status: Not on file     Intimate partner violence     Fear of current or ex partner: Not on file     Emotionally abused: Not on file     Physically abused: Not on file     Forced sexual activity: Not on file   Other Topics Concern     Not on file   Social History Narrative    Second Marriage, 2 children from his first marriage.  No living will but he wants to be a full code.     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    PHYSICAL EXAM:   /76   Pulse 82   Temp 99.6  F (37.6  C)   Resp 18   Wt 124 lb 6.4 oz (56.4 kg)   SpO2 98%   BMI 20.70 kg/m    A limited exam was performed due to recommendations for care during COVID-19 pandemic. Due to the 2020 COVID-19 pandemic, except as noted above, the patient was visually observed at a 6 foot plus distance.  An observational exam was performed in an effort to keep patient safe from COVID-19 and other communicable diseases.     General appearance: alert, appears stated age and cooperative  HEENT: Head is normocephalic with normal hair distribution. No evidence of trauma. Ears: Without lesions or deformity. No acute purulent discharge. Eyes: Conjunctivae pink with no scleral icterus or erythema. Nose: Normal. Oropharnyx: mmm.  Lungs: respirations without effort.  Extremities: extremities normal, atraumatic, no cyanosis.  Skin: Skin color, texture normal. No rashes or lesions on exposed skin.    Neurologic: Grossly normal   Psych: interacts well with caregivers, exhibits illogical thought processes and connections with evidence of dementia--doesn't understand why he is here,\"wants to go home\", pleasant.      LABS:   None today.     ASSESSMENT:      ICD-10-CM    1. Adult failure to thrive  R62.7        PLAN:    Adult failure to thrive, agitation  -Continue current care plan that was initiated yesterday.  -Seroquel 25 mg by mouth in the morning and 50 mg at bedtime.  -Continue to offer supplementation and meals.  -Swallow study to be done today.  Follow-up with results of study.   -Plan to determine next steps off of information obtained from swallow study.   -Plan to follow closely in the coming days.                                                                                                                                                               Otherwise continue current care plan for all other chronic medical conditions, as " they are stable. Encouraged patient to engage in healthy lifestyle behaviors such as engaging in social activities, exercising (PT/OT), eating well, and following care plan. Follow up for routine check-up, or sooner if needed. Will continue to monitor patient and work with nursing staff collaboratively to work toward positive patient outcomes.    Electronically signed by: Saritha Levin CNP

## 2021-06-12 NOTE — PROGRESS NOTES
OFFICE VISIT NOTE  Ortiz Ely   86 y.o. male            Assessment/Plan for  Ortiz Ely is a 86 y.o. male.  No Patient Care Coordination Note on file.             1.  Hypertension controlled without orthostasis  2.  Prostate cancer with biochemical recurrence.  We will draw PSA quarterly and check quarterly per [facility] recommendation  3.  Chronic kidney disease stable-recheck laboratory  4.  Ataxia using cane probably due to his arthritis.    Plan:  Routine lab  Medication refill  Quarterly clinic visit    There are no Patient Instructions on file for this visit.    Diagnoses and all orders for this visit:    Influenza vaccination administered at current visit  -     Influenza High Dose, Seasonal 65+ yrs    Essential hypertension  -     hydroCHLOROthiazide (HYDRODIURIL) 25 MG tablet; Take 1 tablet by mouth  daily  Dispense: 90 tablet; Refill: 3  -     losartan (COZAAR) 50 MG tablet; Take 1 tablet (50 mg total) by mouth daily.  Dispense: 90 tablet; Refill: 3  -     Basic Metabolic Panel    Prostate cancer  -     PSA (Prostatic-Specific Antigen), Diagnostic; Future; Expected date: 12/11/17  -     HM1(CBC and Differential)  -     HM1 (CBC with Diff)  -     PSA (Prostatic-Specific Antigen), Diagnostic; Standing  -     PSA (Prostatic-Specific Antigen), Diagnostic    PSA elevation    CKD (chronic kidney disease) stage 2, GFR 60-89 ml/min    Ataxia        Medications after visit  Current Outpatient Prescriptions   Medication Sig Dispense Refill     aspirin 81 MG EC tablet Take 81 mg by mouth daily.       cyanocobalamin 1000 MCG tablet Take 1 tablet (1,000 mcg total) by mouth daily. 100 tablet 2     hydroCHLOROthiazide (HYDRODIURIL) 25 MG tablet Take 1 tablet by mouth  daily 90 tablet 3     losartan (COZAAR) 50 MG tablet Take 1 tablet (50 mg total) by mouth daily. 90 tablet 3     multivitamin with minerals (THERA-M) 9 mg iron-400 mcg Tab tablet Take 1 tablet by mouth daily.       OMEGA-3/DHA/EPA/FISH OIL (FISH  OIL-OMEGA-3 FATTY ACIDS) 300-1,000 mg capsule Take 1 g by mouth daily.        polyvinyl alcohol (LIQUIFILM TEARS) 1.4 % ophthalmic solution Administer 1 drop to both eyes as needed for dry eyes.       No current facility-administered medications for this visit.                       Preston London MD  Internal medicine  [facility]  [phone]  [email or web address]      This is an electronically verified report by Preston London M.D.  (Note created with Dragon voice recognition and unintended spelling errors and word substitutions may occur)               Subjective:   Chief Complaint:  Hypertension; Follow-up; Flu Vaccine; and Medication Refill    In for follow-up of chronic medical issues    Recent visit at [facility] reviewed  Biochemical recurrence  Not on active treatment  PSA 32 at last count  PET scan showed some pelvic uptake but nothing outside of the pelvis    He is urinating okay    No further orthostasis.  Hypertension-There are no cardiovascular, respiratory, neurologic complaints.No claudication.There is no orthostasis.Patient is compliant with medications.  Medications reviewed.   No side effects from medication.    Review of Systems:     Extensive 10-point review of systems was performed. Please see the HPI for problem specific pertinent review of systems.     Patient does note arthritis with some ataxia.  No recent falls    Otherwise, the following systems are noncontributory including constitutional, eyes, ears, nose and throat, cardiovascular, respiratory, gastrointestinal, genitourinary, musculoskeletal,neurological, skin and/or breast, endocrine, hematologic/lymph, allergic/immunologic and psychiatric.              Medications:  Current Outpatient Prescriptions on File Prior to Visit   Medication Sig     aspirin 81 MG EC tablet Take 81 mg by mouth daily.     cyanocobalamin 1000 MCG tablet Take 1 tablet (1,000 mcg total) by mouth daily.     multivitamin with  "minerals (THERA-M) 9 mg iron-400 mcg Tab tablet Take 1 tablet by mouth daily.     OMEGA-3/DHA/EPA/FISH OIL (FISH OIL-OMEGA-3 FATTY ACIDS) 300-1,000 mg capsule Take 1 g by mouth daily.      polyvinyl alcohol (LIQUIFILM TEARS) 1.4 % ophthalmic solution Administer 1 drop to both eyes as needed for dry eyes.     [DISCONTINUED] hydroCHLOROthiazide (HYDRODIURIL) 25 MG tablet Take 1 tablet by mouth  daily     [DISCONTINUED] losartan (COZAAR) 50 MG tablet Take 1 tablet (50 mg total) by mouth daily.     [DISCONTINUED] naproxen sodium (ALEVE) 220 MG tablet Take 220 mg by mouth daily.     [DISCONTINUED] hydroCHLOROthiazide (HYDRODIURIL) 25 MG tablet Take 1 tablet by mouth  daily     [DISCONTINUED] losartan (COZAAR) 50 MG tablet Take 1 tablet by mouth  daily     No current facility-administered medications on file prior to visit.             Allergies:  Allergies   Allergen Reactions     Cardizem [Diltiazem Hcl]      Dyazide [Triamterene-Hydrochlorothiazid]      Lopressor [Metoprolol Tartrate]      Penicillins Hives     Sulfa (Sulfonamide Antibiotics) Hives     Vasotec [Enalapril Maleate] Cough     Cardura [Doxazosin] Palpitations       PSFHx: Tobacco Status:  He  reports that he quit smoking about 52 years ago. He has a 15.00 pack-year smoking history. He has never used smokeless tobacco.   Alcohol Status:    History   Alcohol Use     Yes     Comment: 2-4 drinks a day (he won't say for sure)       reports that he quit smoking about 52 years ago. He has a 15.00 pack-year smoking history. He has never used smokeless tobacco. He reports that he drinks alcohol. His drug history is not on file.    Objective:    /82 (Patient Site: Right Arm, Patient Position: Standing)  Ht 5' 8\" (1.727 m)  Wt 137 lb (62.1 kg)  BMI 20.83 kg/m2  Weight:   Wt Readings from Last 3 Encounters:   09/11/17 137 lb (62.1 kg)   03/06/17 133 lb (60.3 kg)   01/08/17 130 lb (59 kg)     BP Readings from Last 3 Encounters:   09/11/17 138/82   03/06/17 " 138/68   01/08/17 (!) 198/106         General- elderly/frail  Mild kyphosis  Uses a cane to walk  Neg neck nodes  Rhonchi at bases  No murmur  Soft abd  No edema    No tremor or rigidity  Review of clinical lab tests  Lab Results   Component Value Date    WBC 8.4 09/11/2017    HGB 12.4 (L) 09/11/2017    HCT 37.0 (L) 09/11/2017     09/11/2017    ALT 18 03/06/2017    AST 31 03/06/2017     03/06/2017    K 4.7 03/06/2017     (H) 03/06/2017    CREATININE 1.59 (H) 03/06/2017    BUN 35 (H) 03/06/2017    CO2 20 (L) 03/06/2017    INR 0.90 02/25/2016    HGBA1C 5.2 03/06/2017       Glucose   Date/Time Value Ref Range Status   03/06/2017 01:37 PM 95 70 - 125 mg/dL Final   10/10/2016 03:01 PM 93 74 - 125 mg/dL Final   05/23/2016 03:02  74 - 125 mg/dL Final   02/25/2016 11:59  70 - 125 mg/dL Final   12/22/2013 06:55  70 - 125 mg/dL Final     Comment:          Fasting Glucose reference range is 70-99 mg/dL per       American Diabetes Association (ADA) guidelines.   05/19/2011 10:40  (H) 70 - 125 mg/dL Final     Comment:          Fasting Glucose reference range is 70-99 mg/dL per       American Diabetes Association (ADA) guidelines.     Recent Results (from the past 24 hour(s))   HM1 (CBC with Diff)   Result Value Ref Range    WBC 8.4 4.0 - 11.0 thou/uL    RBC 3.89 (L) 4.40 - 6.20 mill/uL    Hemoglobin 12.4 (L) 14.0 - 18.0 g/dL    Hematocrit 37.0 (L) 40.0 - 54.0 %    MCV 95 80 - 100 fL    MCH 32.0 27.0 - 34.0 pg    MCHC 33.6 32.0 - 36.0 g/dL    RDW 12.4 11.0 - 14.5 %    Platelets 164 140 - 440 thou/uL    MPV 7.8 7.0 - 10.0 fL    Neutrophils % 66 50 - 70 %    Lymphocytes % 20 20 - 40 %    Monocytes % 10 2 - 10 %    Eosinophils % 3 0 - 6 %    Basophils % 1 0 - 2 %    Neutrophils Absolute 5.6 2.0 - 7.7 thou/uL    Lymphocytes Absolute 1.7 0.8 - 4.4 thou/uL    Monocytes Absolute 0.9 0.0 - 0.9 thou/uL    Eosinophils Absolute 0.2 0.0 - 0.4 thou/uL    Basophils Absolute 0.1 0.0 - 0.2 thou/uL        RADIOLOGY: No results found.    Review of recent consultation-[facility] consult  psa 32  Pet ct-focal pelvis

## 2021-06-14 NOTE — PROGRESS NOTES
Call patient  Give result to patientâ  39.5 PSA  When was his last visit with the prostate doctor at [facility]    Preston London MD  Internal medicine  [facility]  [phone]  [email or web address]

## 2021-06-16 PROBLEM — K57.32 DIVERTICULITIS OF COLON: Status: ACTIVE | Noted: 2019-03-26

## 2021-06-16 PROBLEM — S72.142A CLOSED COMMINUTED INTERTROCHANTERIC FRACTURE OF LEFT FEMUR (H): Status: ACTIVE | Noted: 2020-01-01
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
## 2021-06-16 PROBLEM — Z71.89 ADVANCED CARE PLANNING/COUNSELING DISCUSSION: Status: ACTIVE | Noted: 2020-01-01

## 2021-06-16 PROBLEM — S72.142A CLOSED INTERTROCHANTERIC FRACTURE OF HIP, LEFT, INITIAL ENCOUNTER (H): Status: ACTIVE | Noted: 2020-01-01

## 2021-06-16 PROBLEM — R13.19 OTHER DYSPHAGIA: Status: ACTIVE | Noted: 2020-01-01
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
## 2021-06-16 PROBLEM — R79.89 ELEVATED TROPONIN: Status: ACTIVE | Noted: 2019-03-26

## 2021-06-16 PROBLEM — Z51.5 HOSPICE CARE: Status: ACTIVE | Noted: 2020-01-01

## 2021-06-16 PROBLEM — R41.0 CONFUSION: Status: ACTIVE | Noted: 2019-01-01

## 2021-06-16 PROBLEM — S72.002A HIP FRACTURE REQUIRING OPERATIVE REPAIR, LEFT, CLOSED, INITIAL ENCOUNTER (H): Status: ACTIVE | Noted: 2020-01-01

## 2021-06-16 PROBLEM — I82.409 DVT (DEEP VENOUS THROMBOSIS) (H): Status: ACTIVE | Noted: 2020-01-01

## 2021-06-16 PROBLEM — N39.0 URINARY TRACT INFECTION WITHOUT HEMATURIA: Status: ACTIVE | Noted: 2019-03-26

## 2021-06-16 PROBLEM — N17.9 AKI (ACUTE KIDNEY INJURY) (H): Status: ACTIVE | Noted: 2019-03-26

## 2021-06-16 PROBLEM — R53.81 PHYSICAL DECONDITIONING: Status: ACTIVE | Noted: 2020-01-01

## 2021-06-16 PROBLEM — R62.7 ADULT FAILURE TO THRIVE: Status: ACTIVE | Noted: 2020-01-01
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
## 2021-06-16 PROBLEM — R55 SYNCOPE, UNSPECIFIED SYNCOPE TYPE: Status: ACTIVE | Noted: 2019-01-01

## 2021-06-16 PROBLEM — N18.30 CHRONIC KIDNEY DISEASE, STAGE III (MODERATE) (H): Status: ACTIVE | Noted: 2019-01-01

## 2021-06-16 PROBLEM — F43.10 PTSD (POST-TRAUMATIC STRESS DISORDER): Status: ACTIVE | Noted: 2020-01-01

## 2021-06-16 NOTE — TELEPHONE ENCOUNTER
Telephone Encounter by Maria Del Carmen Kaminski CMA at 1/10/2020 11:05 AM     Author: Maria Del Carmen Kaminski CMA Service: -- Author Type: Medical Assistant    Filed: 1/10/2020 11:06 AM Encounter Date: 1/9/2020 Status: Signed    : Maria Del Carmen Kaminski CMA (Medical Assistant)       Left message for the patient relaying the following from Dr. London regarding his refill request:  Preston London MD  You 5 minutes ago (10:58 AM)      Ok     Please set up      Asked that he call with any further questions.  Maria Del Carmen MARIN CMA/HAFSA....................11:06 AM

## 2021-06-18 NOTE — PROGRESS NOTES
Preoperative Consultation   Ortiz Ely   87 y.o.  male    Date of visit: 2018  Physician: Nirmal Mcintosh MD    This is a preoperative consultation [facility] by [facility] in preparation for bilateral cataract surgery on 2018 and the second 1 week later at [facility] [place].       Assessment and Plan   Ortiz Ely was seen in preoperative consultation in preparation for cataract surgery.  This is a low risk surgery and the patient has no increased risk for major cardiac complications.  Please note he is on aspirin which will be continued.  He has known coronary artery disease without history of myocardial infarction or intervention.  History of smoking.  He will continue his blood pressure medications through surgery.    1. Preoperative examination    2. Cataract of both eyes, unspecified cataract type    3. Essential hypertension    4. Diabetes mellitus type 2 in nonobese    5. Chronic sinus bradycardia    6. Coronary stenosis    7. Prostate cancer    8. Alcohol abuse         Patient Profile   Social History     Social History Narrative    Second Marriage, 2 children from his first marriage.  No living will but he wants to be a full code.     Ruma Reyna 08    Judi- 12/10/06    [id number]-[id number] order    Daughter-Ml    Retired [employer]    Eva emelia         Past Medical History   Patient Active Problem List   Diagnosis     Prostate cancer     HTN (hypertension)     Coronary stenosis     Alcohol abuse     Diabetes mellitus type 2 in nonobese     History of DVT (deep vein thrombosis)     Chronic sinus bradycardia       Past Surgical History  He has a past surgical history that includes Adrenalectomy (); Appendectomy (); Hernia repair (Bilateral); Breast lumpectomy (Right, 195); and Tonsillectomy.     History of Present Illness   This 87 y.o. old man comes in for preoperative evaluation.  He is going to be having cataract surgery.   Today he feels well.  He has no complaints of chest pain shortness of breath.  No infectious symptoms.  He is a bit uncertain about a lot of his history.  His wife does not offer any additional clues.  We did review his chart.  He looks like he has a history of prostate cancer is followed at [facility].  Additionally, he has had issues with alcohol intoxication and falls.  Most recently February 2016 and he did have a subdural hematoma.  This presumably has resolved.  There is mention of a DVT but he has no recollection of this and I cannot find the documentation.  He denies history of stroke.    Recent antiplatelet use: yes Aspirin  Personal or family history of bleeding or clotting disorders: See above  Steroid use in the past year: no  Personal or family history of difficulty with anesthesia: no  Current cardiopulmonary symptoms: no    Review of Systems: A comprehensive review of systems was negative except as noted.     Medications and Allergies   Current Outpatient Prescriptions   Medication Sig Dispense Refill     aspirin 81 MG EC tablet Take 81 mg by mouth daily.       hydroCHLOROthiazide (HYDRODIURIL) 25 MG tablet Take 1 tablet by mouth  daily 90 tablet 3     losartan (COZAAR) 50 MG tablet Take 1 tablet (50 mg total) by mouth daily. 90 tablet 3     multivitamin with minerals (THERA-M) 9 mg iron-400 mcg Tab tablet Take 1 tablet by mouth daily.       OMEGA-3/DHA/EPA/FISH OIL (FISH OIL-OMEGA-3 FATTY ACIDS) 300-1,000 mg capsule Take 1 g by mouth daily.        polyvinyl alcohol (LIQUIFILM TEARS) 1.4 % ophthalmic solution Administer 1 drop to both eyes as needed for dry eyes.       No current facility-administered medications for this visit.      Allergies   Allergen Reactions     Cardizem [Diltiazem Hcl]      Dyazide [Triamterene-Hydrochlorothiazid]      Lopressor [Metoprolol Tartrate]      Penicillins Hives     Sulfa (Sulfonamide Antibiotics) Hives     Vasotec [Enalapril Maleate] Cough     Cardura [Doxazosin]  "Palpitations        Family and Social History   Family History   Problem Relation Age of Onset     Alcoholism       Other Mother      old age,  at 88     Melanoma Father         Social History   Substance Use Topics     Smoking status: Former Smoker     Packs/day: 1.00     Years: 15.00     Quit date: 1965     Smokeless tobacco: Never Used     Alcohol use Yes      Comment: 2-4 drinks a day (he won't say for sure)        Physical Exam   General Appearance:   No acute distress    /60 (Patient Site: Left Arm, Patient Position: Sitting, Cuff Size: Adult Regular)  Pulse 85  Ht 5' 8\" (1.727 m)  Wt 122 lb (55.3 kg)  SpO2 99%  BMI 18.55 kg/m2    EYES: Eyelids, conjunctiva, and sclera were normal. Pupils were normal.   HEAD, EARS, NOSE, MOUTH, AND THROAT: Head and face were normal. Hearing was normal to voice and the ears were normal to external exam. Nose appearance was normal and there was no discharge. Oropharynx was normal.  NECK: Neck appearance was normal. There were no neck masses and the thyroid was not enlarged.  RESPIRATORY: Breathing pattern was normal and the chest moved symmetrically.  Percussion/auscultatory percussion was normal.  Lung sounds were normal and there were no abnormal sounds.  CARDIOVASCULAR: Heart rate and rhythm were normal.  S1 and S2 were normal and there were no extra sounds or murmurs. Peripheral pulses in arms and legs were normal.  Jugular venous pressure was normal.  There was no peripheral edema.  GASTROINTESTINAL: The abdomen was normal in contour.  Bowel sounds were present.  Percussion detected no organ enlargement or tenderness.  Palpation detected no tenderness, mass, or enlarged organs.   MUSCULOSKELETAL: Skeletal configuration was normal and muscle mass was normal for age. Joint appearance was overall normal.  LYMPHATIC: There were no enlarged nodes.  SKIN/HAIR/NAILS: Skin color was normal.  There were no skin lesions.  Hair and nails were normal.  NEUROLOGIC: " The patient was alert and oriented to person, place, time, and circumstance. Speech was normal. Cranial nerves were normal. Motor strength was normal for age. The patient was normally coordinated.  PSYCHIATRIC:  Mood and affect were normal and the patient had normal recent and remote memory. The patient's judgment and insight were normal.       Additional Tests   Laboratory: Potassium and creatinine pending    Radiology: I reviewed available imaging studies    Electrocardiogram: Reviewed an EKG from 2016, normal sinus rhythm    Total time spent with the patient today was 40 minutes of which > 50% was spent in counseling and coordination of care     Nirmal Mcintosh MD  Internal Medicine  Contact me at [phone]

## 2021-06-19 NOTE — LETTER
Letter by Preston London MD at      Author: Preston London MD Service: -- Author Type: --    Filed:  Encounter Date: 9/27/2019 Status: Signed         September 27, 2019     Patient: Ortiz Ely   YOB: 1930   Date of Visit: 9/27/2019       To Whom It May Concern:    It is my medical opinion that Ortiz Ely  Can safely operate a motor vehicle upon the public streets and highways.    If you have any questions or concerns, please don't hesitate to call.    Sincerely,        Electronically signed by Preston London MD

## 2021-06-19 NOTE — PROGRESS NOTES
OFFICE VISIT NOTE  Ortiz Ely   87 y.o. male            Assessment/Plan for  Ortiz Ely is a 87 y.o. male.  No Patient Care Coordination Note on file.       1. Essential hypertension  Very successful cataract surgery.  Blood pressure well controlled  - losartan (COZAAR) 50 MG tablet; Take 1 tablet (50 mg total) by mouth daily.  Dispense: 90 tablet; Refill: 3    2. Prostate cancer (H)  With slowly rising PSA  On observation.  Totally asymptomatic.  We are not treating in November.  I asked him if his PSA was higher and he felt well would he like Lupron injections and he declined.  Declines urology referral at this time given that he is not considering active treatment.  3. Diabetes mellitus type 2 in nonobese (H)  Diet-controlled    4. Elevated prostate specific antigen (PSA)  Following         Plan:  Continue current Rx  Wellness check 6 months  At some point consider Lupron the patient is totally asymptomatic.  Particularly he is got a great appetite gain weight and not having any pain    There are no Patient Instructions on file for this visit.    Diagnoses and all orders for this visit:    Prostate cancer (H)  -     HM1(CBC and Differential)  -     PSA (Prostatic-Specific Antigen), Diagnostic  -     HM1 (CBC with Diff)    Essential hypertension  -     losartan (COZAAR) 50 MG tablet; Take 1 tablet (50 mg total) by mouth daily.  Dispense: 90 tablet; Refill: 3  -     Renal Function Profile    Diabetes mellitus type 2 in nonobese (H)  -     Glycosylated Hemoglobin A1c  -     Glucose    Elevated prostate specific antigen (PSA)        Medications after visit  Current Outpatient Prescriptions   Medication Sig Dispense Refill     aspirin 81 MG EC tablet Take 81 mg by mouth daily.       hydroCHLOROthiazide (HYDRODIURIL) 25 MG tablet Take 1 tablet by mouth  daily 90 tablet 3     losartan (COZAAR) 50 MG tablet Take 1 tablet (50 mg total) by mouth daily. 90 tablet 3     multivitamin with minerals (THERA-M) 9 mg iron-400  mcg Tab tablet Take 1 tablet by mouth daily.       No current facility-administered medications for this visit.                       Preston London MD  Internal medicine  [facility]  [phone]  [email or web address]    Much or all of the text in this note was generated through the use of Dragon Dictate voice-to-text software. Errors in spelling or words which seem out of context are unintentional.   Sound alike errors, in particular, may have escaped editing.                 Subjective:   Chief Complaint:  Follow-up (stopped going to [place], needs new urologist for prostate )    In for follow-up    Very pleased with his cataract resolve    Distant prostate cancer  Slowly rising PSA from the 20 is now into the high 30s.  No bone pain shortness of breath GI disturbance.  His appetite is good  He has regained weight.    Hypertension-There are no cardiovascular, respiratory, neurologic complaints.No claudication.There is no orthostasis.Patient is compliant with medications.  Medications reviewed.   No side effects from medication.    Diet-controlled diabetes.  Does not check sugars.  No polydipsia polyuria    Review of Systems:     Extensive 10-point review of systems was performed. Please see the HPI for problem specific pertinent review of systems.     Patient does note he is sleeping well    Otherwise, the following systems are noncontributory including constitutional, eyes, ears, nose and throat, cardiovascular, respiratory, gastrointestinal, genitourinary, musculoskeletal,neurological, skin and/or breast, endocrine, hematologic/lymph, allergic/immunologic and psychiatric.              Medications:  Current Outpatient Prescriptions on File Prior to Visit   Medication Sig     aspirin 81 MG EC tablet Take 81 mg by mouth daily.     hydroCHLOROthiazide (HYDRODIURIL) 25 MG tablet Take 1 tablet by mouth  daily     multivitamin with minerals (THERA-M) 9 mg iron-400 mcg Tab tablet Take  "1 tablet by mouth daily.     [DISCONTINUED] losartan (COZAAR) 50 MG tablet Take 1 tablet (50 mg total) by mouth daily.     [DISCONTINUED] OMEGA-3/DHA/EPA/FISH OIL (FISH OIL-OMEGA-3 FATTY ACIDS) 300-1,000 mg capsule Take 1 g by mouth daily.      [DISCONTINUED] polyvinyl alcohol (LIQUIFILM TEARS) 1.4 % ophthalmic solution Administer 1 drop to both eyes as needed for dry eyes.     No current facility-administered medications on file prior to visit.             Allergies:  Allergies   Allergen Reactions     Cardizem [Diltiazem Hcl]      Dyazide [Triamterene-Hydrochlorothiazid]      Lopressor [Metoprolol Tartrate]      Penicillins Hives     Sulfa (Sulfonamide Antibiotics) Hives     Vasotec [Enalapril Maleate] Cough     Cardura [Doxazosin] Palpitations       PSFHx: Tobacco Status:  He  reports that he quit smoking about 53 years ago. He has a 15.00 pack-year smoking history. He has never used smokeless tobacco.   Alcohol Status:    History   Alcohol Use     Yes     Comment: 2-4 drinks a day (he won't say for sure)       reports that he quit smoking about 53 years ago. He has a 15.00 pack-year smoking history. He has never used smokeless tobacco. He reports that he drinks alcohol. He reports that he does not use illicit drugs.    Objective:    /70  Pulse 73  Ht 5' 8\" (1.727 m)  Wt 133 lb (60.3 kg)  SpO2 100%  BMI 20.22 kg/m2  Weight:   Wt Readings from Last 3 Encounters:   08/08/18 133 lb (60.3 kg)   06/08/18 122 lb (55.3 kg)   09/11/17 137 lb (62.1 kg)     BP Readings from Last 3 Encounters:   08/08/18 126/70   06/08/18 134/60   09/11/17 138/82         General-appears well, no acute distress.  Pulses regular  Lungs are clear  Cardiac is regular without murmur  Soft abdomen  No edema  Gait normal      Review of clinical lab tests  Lab Results   Component Value Date    WBC 8.4 09/11/2017    HGB 12.4 (L) 09/11/2017    HCT 37.0 (L) 09/11/2017     09/11/2017    ALT 18 03/06/2017    AST 31 03/06/2017     " 09/11/2017    K 4.5 06/08/2018     09/11/2017    CREATININE 2.90 (H) 06/08/2018    BUN 30 (H) 09/11/2017    CO2 22 09/11/2017    PSA 39.5 (H) 12/13/2017    INR 0.90 02/25/2016    HGBA1C 5.2 03/06/2017       Glucose   Date/Time Value Ref Range Status   09/11/2017 02:26  70 - 125 mg/dL Final   03/06/2017 01:37 PM 95 70 - 125 mg/dL Final   10/10/2016 03:01 PM 93 74 - 125 mg/dL Final   05/23/2016 03:02  74 - 125 mg/dL Final   02/25/2016 11:59  70 - 125 mg/dL Final   12/22/2013 06:55  70 - 125 mg/dL Final     Comment:          Fasting Glucose reference range is 70-99 mg/dL per       American Diabetes Association (ADA) guidelines.   05/19/2011 10:40  (H) 70 - 125 mg/dL Final     Comment:          Fasting Glucose reference range is 70-99 mg/dL per       American Diabetes Association (ADA) guidelines.     No results found for this or any previous visit (from the past 24 hour(s)).    RADIOLOGY: No results found.  3  Review of recent consultation-      Lab Results   Component Value Date    PSA 39.5 (H) 12/13/2017    PSA 34.8 (H) 09/11/2017     32.6  8/17  27.7  11/2016  23.6  3/2016

## 2021-06-20 NOTE — LETTER
Letter by Saritha Levin CNP at      Author: Saritha Levin CNP Service: -- Author Type: --    Filed:  Encounter Date: 6/15/2020 Status: (Other)         Patient: Ortiz Ely   MR Number: [id number]   YOB: 1930   Date of Visit: 6/15/2020     [facility]    Facility:   [facility] [[id number]]   Code Status: DNR/DNI and POLST AVAILABLE      CHIEF COMPLAINT/REASON FOR VISIT:  Chief Complaint   Patient presents with   ? Problem Visit     Adult failure to thrive, agitation, falls       HISTORY:      HPI: Ortiz is a 89 y.o. male who  has a past medical history of Alcohol abuse, Angioedema, Cholelithiasis, Coronary stenosis, HTN (hypertension), and Prostate cancer (H) (2004).  Here he was recently hospitalized at [facility] from May 2 to May 10, 2020.  The discharging provider summarized the hospitalization in previous notes.     Today Ortiz is being evaluated by request of nursing staff for ongoing issues with adult failure to thrive, falls and agitation.  He continues to do quite poorly with therapies.  He has not been eating or drinking.  He has continued to lose weight.  He at this time has a 15 pound weight loss.  He denies any symptoms or issues.  We have run multiple labs and multiple UA/UC.  He is also had chest x-ray, all of which were essentially negative.  He does have slight leukocytosis.  However, no radiation of infection at this time.  Nursing staff have no specific complaints or concerns.  Ortiz does not report or relate anything to them.  He does not share anything with his sister-in-law or daughter.  They do notice he is a bit more confused.  He does not have any symptoms except for agitation and falls during the night.  This is been something that has been ongoing due to PTSD.  Will attempt to discuss advanced care planning with daughter and sister-in-law Eugenia.  At this time we will continue to monitor until care plan and wishes are fully  understood.  Ortiz denies any other concerns including fevers/chills, cough or cold symptoms, headaches, vision changes, chest pain/pressure, difficulty breathing, SOB, abdominal pain, nausea, vomiting, diarrhea, dysuria, increasing weakness, increasing pain.     Past Medical History:   Diagnosis Date   ? Alcohol abuse     Multiple episodes dating back to    ? Angioedema    ? Cholelithiasis    ? Coronary stenosis    ? HTN (hypertension)    ? Prostate cancer (H) 2004    Cryotherapy             Family History   Problem Relation Age of Onset   ? Alcoholism Unknown    ? Other Mother         old age,  at 88   ? Melanoma Father      Social History     Socioeconomic History   ? Marital status:      Spouse name: Not on file   ? Number of children: 2   ? Years of education: Not on file   ? Highest education level: Not on file   Occupational History   ? Occupation: Retired    Social Needs   ? Financial resource strain: Not on file   ? Food insecurity     Worry: Not on file     Inability: Not on file   ? Transportation needs     Medical: Not on file     Non-medical: Not on file   Tobacco Use   ? Smoking status: Former Smoker     Packs/day: 1.00     Years: 15.00     Pack years: 15.00     Last attempt to quit: 1965     Years since quittin.4   ? Smokeless tobacco: Never Used   Substance and Sexual Activity   ? Alcohol use: Yes     Comment: 2-4 drinks a day (he won't say for sure)   ? Drug use: No   ? Sexual activity: Not on file   Lifestyle   ? Physical activity     Days per week: Not on file     Minutes per session: Not on file   ? Stress: Not on file   Relationships   ? Social connections     Talks on phone: Not on file     Gets together: Not on file     Attends [employer] service: Not on file     Active member of club or organization: Not on file     Attends meetings of clubs or organizations: Not on file     Relationship status: Not on file   ? Intimate partner violence     Fear of current or ex  partner: Not on file     Emotionally abused: Not on file     Physically abused: Not on file     Forced sexual activity: Not on file   Other Topics Concern   ? Not on file   Social History Narrative    Second Marriage, 2 children from his first marriage.  No living will but he wants to be a full code.     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    PHYSICAL EXAM:   /76   Pulse 82   Temp 99.6  F (37.6  C)   Resp 18   Wt 124 lb 6.4 oz (56.4 kg)   SpO2 98%   BMI 20.70 kg/m    A limited exam was performed due to recommendations for care during COVID-19 pandemic. Due to the  COVID-19 , except as noted above, the patient was visually observed at a 6 foot plus distance.  An observational exam was performed in an effort to keep patient safe from COVID-19 and other communicable diseases.     General appearance: alert, appears stated age and cooperative  HEENT: Head is normocephalic with normal hair distribution. No evidence of trauma. Ears: Without lesions or deformity. No acute purulent discharge. Eyes: Conjunctivae pink with no scleral icterus or erythema. Nose: Normal. Oropharnyx: mmm.  Lungs: respirations without effort.  Extremities: extremities normal, atraumatic, no cyanosis.  Skin: Skin color, texture normal. No rashes or lesions on exposed skin.    Neurologic: Grossly normal   Psych: interacts well with caregivers, exhibits illogical thought processes and connections with evidence of dementia, pleasant.      LABS:   None today.     ASSESSMENT:      ICD-10-CM    1. Adult failure to thrive  R62.7    2. Agitation  R45.1    3. Fall, initial encounter  W19.XXXA        PLAN:    Adult failure to thrive, agitation  -Recent lab work is essentially unchanged from previous.  -Does have slight leukocytosis, but no evidence of infection.  Recent  UA something negative and recent chest x-ray is negative.  Wound looks clean, dry and approximated.  -Will attempt to discuss care needs with sister-in-law and daughter.  Need a better understanding of how aggressive they would like to treat Ortiz.  -Would suggest hospice and long-term care at this point.    Fall  -Please place on fall precautions and monitor patient routinely.  -Encouraged patient to ask for help with all transfers.   -Continue to assess for developing injuries and if patient reports any pain request provider follow-up.                                                                                                                                                                     Otherwise continue current care plan for all other chronic medical conditions, as they are stable. Encouraged patient to engage in healthy lifestyle behaviors such as engaging in social activities, exercising (PT/OT), eating well, and following care plan. Follow up for routine check-up, or sooner if needed. Will continue to monitor patient and work with nursing staff collaboratively to work toward positive patient outcomes.    Electronically signed by: Saritha Levin CNP

## 2021-06-20 NOTE — LETTER
Letter by Saritha Levin CNP at      Author: Saritha Levin CNP Service: -- Author Type: --    Filed:  Encounter Date: 6/12/2020 Status: (Other)         Patient: Ortiz Ely   MR Number: [id number]   YOB: 1930   Date of Visit: 6/12/2020     [facility]    Facility:   [facility] [[id number]]   Code Status: DNR/DNI and POLST AVAILABLE      CHIEF COMPLAINT/REASON FOR VISIT:  Chief Complaint   Patient presents with   ? Problem Visit     refusal to eat, weakness       HISTORY:      HPI: Ortiz is a 89 y.o. male who  has a past medical history of Alcohol abuse, Angioedema, Cholelithiasis, Coronary stenosis, HTN (hypertension), and Prostate cancer (H) (2004).  Here he was recently hospitalized at [facility] from May 2 to May 10, 2020.  The discharging provider summarized the hospitalization in previous notes.     Today Ortiz is being evaluated for a follow-up to his recent issues with failure to thrive per request of nursing staff. Did review case with nursing staff. They share that he has been continuing to fail. He is now refusing to eat. He is stating he is going on a hunger strike. When I speak to him about this, he shares that he is just not hungry. Ortiz states he will eat when he is ready. Nurse Jeffry reports that he has been telling him he is on a hunger strike and that he does not care anymore. Ortiz denies this to writer. He again is very vague. Shares he is weak, but does not have any specific problems. Ortiz does not want to go to the hospital. Ortiz denies any other concerns including fevers/chills, cough or cold symptoms, headaches, vision changes, chest pain/pressure, difficulty breathing, SOB, abdominal pain, nausea, vomiting, diarrhea, dysuria, increasing weakness, increasing pain.     Past Medical History:   Diagnosis Date   ? Alcohol abuse     Multiple episodes dating back to 2001   ? Angioedema    ? Cholelithiasis    ? Coronary stenosis     ? HTN (hypertension)    ? Prostate cancer (H) 2004    Cryotherapy             Family History   Problem Relation Age of Onset   ? Alcoholism Unknown    ? Other Mother         old age,  at 88   ? Melanoma Father      Social History     Socioeconomic History   ? Marital status:      Spouse name: Not on file   ? Number of children: 2   ? Years of education: Not on file   ? Highest education level: Not on file   Occupational History   ? Occupation: Retired    Social Needs   ? Financial resource strain: Not on file   ? Food insecurity     Worry: Not on file     Inability: Not on file   ? Transportation needs     Medical: Not on file     Non-medical: Not on file   Tobacco Use   ? Smoking status: Former Smoker     Packs/day: 1.00     Years: 15.00     Pack years: 15.00     Last attempt to quit: 1965     Years since quittin.4   ? Smokeless tobacco: Never Used   Substance and Sexual Activity   ? Alcohol use: Yes     Comment: 2-4 drinks a day (he won't say for sure)   ? Drug use: No   ? Sexual activity: Not on file   Lifestyle   ? Physical activity     Days per week: Not on file     Minutes per session: Not on file   ? Stress: Not on file   Relationships   ? Social connections     Talks on phone: Not on file     Gets together: Not on file     Attends Voodoo service: Not on file     Active member of club or organization: Not on file     Attends meetings of clubs or organizations: Not on file     Relationship status: Not on file   ? Intimate partner violence     Fear of current or ex partner: Not on file     Emotionally abused: Not on file     Physically abused: Not on file     Forced sexual activity: Not on file   Other Topics Concern   ? Not on file   Social History Narrative    Second Marriage, 2 children from his first marriage.  No living will but he wants to be a full code.     Ruma Reyna 08    Judi- 12/10/06    [id number] order    Daughter-Ml    Retired [employer]    Eva kapoor        REVIEW OF SYSTEM:  Pertinent items are noted in HPI.    PHYSICAL EXAM:   /60   Pulse 72   Temp 98.3  F (36.8  C)   Resp 18   Wt 124 lb 6.4 oz (56.4 kg)   SpO2 94%   BMI 20.70 kg/m    A limited exam was performed due to recommendations for care during COVID-19 pandemic. Due to the 2020 COVID-19 pandemic, except as noted above, the patient was visually observed at a 6 foot plus distance.  An observational exam was performed in an effort to keep patient safe from COVID-19 and other communicable diseases.     General appearance: alert, appears stated age and cooperative  HEENT: Head is normocephalic with normal hair distribution. No evidence of trauma. Ears: Without lesions or deformity. No acute purulent discharge. Eyes: Conjunctivae pink with no scleral icterus or erythema. Nose: Normal. Oropharnyx: mmm.  Lungs: respirations without effort.  Extremities: extremities normal, atraumatic, no cyanosis.  Skin: Skin color, texture normal. No rashes or lesions on exposed skin.    Neurologic: Grossly normal   Psych: interacts well with caregivers, exhibits illogical thought processes and connections with evidence of dementia, pleasant.      LABS:   None today.     ASSESSMENT:      ICD-10-CM    1. Adult failure to thrive  R62.7        PLAN:    Confusion, AFTT  -Psych eval and treat.   -Encourage fluids.  -SLP to eval and treat.   -Unsure as to what is causing decline at this time. Ortiz is quite vague and states that he is fine, reports no problems.   -Care conference requested with family.   -Encourage nutrition, encourage supplementation.                                                                                                                                                                                                                                            Otherwise continue current care plan for all other chronic medical conditions, as they are stable. Encouraged  patient to engage in healthy lifestyle behaviors such as engaging in social activities, exercising (PT/OT), eating well, and following care plan. Follow up for routine check-up, or sooner if needed. Will continue to monitor patient and work with nursing staff collaboratively to work toward positive patient outcomes.    Electronically signed by: Saritha Levin CNP

## 2021-06-20 NOTE — LETTER
Letter by Saritha Levin CNP at      Author: Saritha Levin CNP Service: -- Author Type: --    Filed:  Encounter Date: 5/21/2020 Status: (Other)         Patient: Ortiz Ely   MR Number: [id number]   YOB: 1930   Date of Visit: 5/21/2020     [facility]    Facility:   [facility] [[id number]]   Code Status: DNR/DNI and POLST AVAILABLE      CHIEF COMPLAINT/REASON FOR VISIT:  Chief Complaint   Patient presents with   ? Problem Visit     PTSD, agitation       HISTORY:      HPI: Ortiz is a 89 y.o. male who  has a past medical history of Alcohol abuse, Angioedema, Cholelithiasis, Coronary stenosis, HTN (hypertension), and Prostate cancer (H) (2004).  Here he was recently hospitalized at [facility] from May 2 to May 10, 2020.  The discharging provider summarized the hospitalization in previous notes.     Today Ortiz is being evaluated for a follow-up to recent evaluation for night terrors and PTSD.  Ortiz has had ongoing issues for many years with PTSD.  He states that he used to just have his wife hold his hand.  He states that it might help to have a nurse hold his hand in the middle of the night occasionally.  He did start prazosin a few nights ago and states that he does believe there has been a significant change.  He is feeling markedly well at this time and does not have any new concerns or issues to report.  His leg which had copious amounts of drainage is now improving.  We will continue with the same dressing type.  Ortiz denies any other concerns including fevers/chills, cough or cold symptoms, headaches, vision changes, chest pain/pressure, difficulty breathing, SOB, abdominal pain, nausea, vomiting, diarrhea, dysuria, increasing weakness, increasing pain.     Past Medical History:   Diagnosis Date   ? Alcohol abuse     Multiple episodes dating back to 2001   ? Angioedema    ? Cholelithiasis    ? Coronary stenosis    ? HTN (hypertension)    ?  Prostate cancer (H) 2004    Cryotherapy             Family History   Problem Relation Age of Onset   ? Alcoholism Unknown    ? Other Mother         old age,  at 88   ? Melanoma Father      Social History     Socioeconomic History   ? Marital status:      Spouse name: Not on file   ? Number of children: 2   ? Years of education: Not on file   ? Highest education level: Not on file   Occupational History   ? Occupation: Retired    Social Needs   ? Financial resource strain: Not on file   ? Food insecurity     Worry: Not on file     Inability: Not on file   ? Transportation needs     Medical: Not on file     Non-medical: Not on file   Tobacco Use   ? Smoking status: Former Smoker     Packs/day: 1.00     Years: 15.00     Pack years: 15.00     Last attempt to quit: 1965     Years since quittin.4   ? Smokeless tobacco: Never Used   Substance and Sexual Activity   ? Alcohol use: Yes     Comment: 2-4 drinks a day (he won't say for sure)   ? Drug use: No   ? Sexual activity: Not on file   Lifestyle   ? Physical activity     Days per week: Not on file     Minutes per session: Not on file   ? Stress: Not on file   Relationships   ? Social connections     Talks on phone: Not on file     Gets together: Not on file     Attends Baptism service: Not on file     Active member of club or organization: Not on file     Attends meetings of clubs or organizations: Not on file     Relationship status: Not on file   ? Intimate partner violence     Fear of current or ex partner: Not on file     Emotionally abused: Not on file     Physically abused: Not on file     Forced sexual activity: Not on file   Other Topics Concern   ? Not on file   Social History Narrative    Second Marriage, 2 children from his first marriage.  No living will but he wants to be a full code.     Ruma Reyna 08    Judi- 12/10/06    [id number]-[id number] order    Daughter-Ml    Retired [employer]    Eva kapoor         REVIEW OF SYSTEM:  Pertinent items are noted in HPI.    PHYSICAL EXAM:   /72   Pulse 75   Temp 97.1  F (36.2  C)   Resp 16   Wt 136 lb 6.4 oz (61.9 kg)   SpO2 97%   BMI 22.70 kg/m    A limited exam was performed due to recommendations for care during COVID-19 pandemic. Due to the 2020 COVID-19 pandemic, except as noted above, the patient was visually observed at a 6 foot plus distance.  An observational exam was performed in an effort to keep patient safe from COVID-19 and other communicable diseases.     General appearance: alert, appears stated age and cooperative  HEENT: Head is normocephalic with normal hair distribution. No evidence of trauma. Ears: Without lesions or deformity. No acute purulent discharge. Eyes: Conjunctivae pink with no scleral icterus or erythema. Nose: Normal. Oropharnyx: mmm.  Lungs: respirations without effort.  Extremities: extremities normal, atraumatic, no cyanosis.  Skin: Skin color, texture normal. No rashes or lesions on exposed skin.  Bandages appear to have moderate amount of serosanguineous drainage on left hip.  Neurologic: Grossly normal   Psych: interacts well with caregivers, exhibits logical thought processes and connections, pleasant.      LABS:   None today.     ASSESSMENT:      ICD-10-CM    1. Agitation  R45.1    2. PTSD (post-traumatic stress disorder)  F43.10    3. Hip fracture requiring operative repair, left, closed, initial encounter (H)  S72.002A        PLAN:    Parnoia, PTSD  -Prazosin 2 mg by mouth at bedtime.  -Refuses psych eval and treat at this time, will continue to encourage and discuss psych eval and treat.   -Did discuss with nursing staff that he feels much better at night with the tears if someone simply holds his hand for a few minutes.  -Follow up as needed and per routine.     Left Hip Fracture  -Tylenol 650 mg by mouth 4 times daily.  -Changed ASA to 81 mg by mouth twice daily. This is for prophylaxis of DVT after surgical  repair.  -Oxycodone 2.5 to 5 mg by mouth every 3 hours as needed for significant postop pain.  -Wound dressing change to: Hydrofiber dressing to wound base and cover with ABD, then wrap with kerlix daily.     Otherwise continue current care plan for all other chronic medical conditions, as they are stable. Encouraged patient to engage in healthy lifestyle behaviors such as engaging in social activities, exercising (PT/OT), eating well, and following care plan. Follow up for routine check-up, or sooner if needed. Will continue to monitor patient and work with nursing staff collaboratively to work toward positive patient outcomes.    Electronically signed by: Saritha Levin CNP          Ambulatory

## 2021-06-20 NOTE — LETTER
Letter by Saritha Levin CNP at      Author: Saritha Levin CNP Service: -- Author Type: --    Filed:  Encounter Date: 5/20/2020 Status: (Other)         Patient: Ortiz Ely   MR Number: [id number]   YOB: 1930   Date of Visit: 5/20/2020     [facility]    Facility:   [facility] [[id number]]   Code Status: DNR/DNI and POLST AVAILABLE      CHIEF COMPLAINT/REASON FOR VISIT:  Chief Complaint   Patient presents with   ? Problem Visit     nightmares, anxiety       HISTORY:      HPI: Ortiz is a 89 y.o. male who  has a past medical history of Alcohol abuse, Angioedema, Cholelithiasis, Coronary stenosis, HTN (hypertension), and Prostate cancer (H) (2004).  Here he was recently hospitalized at [facility] from May 2 to May 10, 2020.  The discharging provider summarized the hospitalization in previous notes.     Today Ortiz is being evaluated by request of nursing staff for ongoing issues with paranoia and terror on the evening and night shifts. Ortiz states that this has been an ongoing issue for him. [place] has been in [place]. He has had issues since that time. He used to have his wife hold his had at night and that helped. He just doesn't want to think about what he has seen. He states that he hasn't been treated before. [facility] He does not have any access to that service at that time. He is agreeable to using a medication at this time. Ortiz denies any other concerns including fevers/chills, cough or cold symptoms, headaches, vision changes, chest pain/pressure, difficulty breathing, SOB, abdominal pain, nausea, vomiting, diarrhea, dysuria, increasing weakness, increasing pain.     Past Medical History:   Diagnosis Date   ? Alcohol abuse     Multiple episodes dating back to 2001   ? Angioedema    ? Cholelithiasis    ? Coronary stenosis    ? HTN (hypertension)    ? Prostate cancer (H) 2004    Cryotherapy             Family History    Problem Relation Age of Onset   ? Alcoholism Unknown    ? Other Mother         old age,  at 88   ? Melanoma Father      Social History     Socioeconomic History   ? Marital status:      Spouse name: Not on file   ? Number of children: 2   ? Years of education: Not on file   ? Highest education level: Not on file   Occupational History   ? Occupation: Retired    Social Needs   ? Financial resource strain: Not on file   ? Food insecurity     Worry: Not on file     Inability: Not on file   ? Transportation needs     Medical: Not on file     Non-medical: Not on file   Tobacco Use   ? Smoking status: Former Smoker     Packs/day: 1.00     Years: 15.00     Pack years: 15.00     Last attempt to quit: 1965     Years since quittin.4   ? Smokeless tobacco: Never Used   Substance and Sexual Activity   ? Alcohol use: Yes     Comment: 2-4 drinks a day (he won't say for sure)   ? Drug use: No   ? Sexual activity: Not on file   Lifestyle   ? Physical activity     Days per week: Not on file     Minutes per session: Not on file   ? Stress: Not on file   Relationships   ? Social connections     Talks on phone: Not on file     Gets together: Not on file     Attends Worship service: Not on file     Active member of club or organization: Not on file     Attends meetings of clubs or organizations: Not on file     Relationship status: Not on file   ? Intimate partner violence     Fear of current or ex partner: Not on file     Emotionally abused: Not on file     Physically abused: Not on file     Forced sexual activity: Not on file   Other Topics Concern   ? Not on file   Social History Narrative    Second Marriage, 2 children from his first marriage.  No living will but he wants to be a full code.     Ruma Ramirezin 08    Judi- 12/10/06    [id number]-gaomeeodtdd order    Daughter-Ml    Retired [employer]    Eva kapoor        REVIEW OF SYSTEM:  Pertinent items are noted in  HPI.    PHYSICAL EXAM:   /62   Pulse 74   Temp 98.4  F (36.9  C)   Resp 18   Wt 139 lb 3.2 oz (63.1 kg)   SpO2 96%   BMI 23.16 kg/m    A limited exam was performed due to recommendations for care during COVID-19 pandemic. Due to the 2020 COVID-19 pandemic, except as noted above, the patient was visually observed at a 6 foot plus distance.  An observational exam was performed in an effort to keep patient safe from COVID-19 and other communicable diseases.     General appearance: alert, appears stated age and cooperative  HEENT: Head is normocephalic with normal hair distribution. No evidence of trauma. Ears: Without lesions or deformity. No acute purulent discharge. Eyes: Conjunctivae pink with no scleral icterus or erythema. Nose: Normal. Oropharnyx: mmm.  Lungs: respirations without effort.  Extremities: extremities normal, atraumatic, no cyanosis.  Skin: Skin color, texture normal. No rashes or lesions on exposed skin.  Bandages appear to have moderate amount of serosanguineous drainage on left hip.  Neurologic: Grossly normal   Psych: interacts well with caregivers, exhibits logical thought processes and connections, pleasant.      LABS:   None today.     ASSESSMENT:      ICD-10-CM    1. Paranoia (H)  F22    2. PTSD (post-traumatic stress disorder)  F43.10        PLAN:    Parnoia, PTSD  -Start Prazosin 1 mg by mouth nightly x 3 days, then increase to 2 mg by mouth at bedtime.   -Psych eval and treat.   -Follow up as needed and per routine.     Otherwise continue current care plan for all other chronic medical conditions, as they are stable. Encouraged patient to engage in healthy lifestyle behaviors such as engaging in social activities, exercising (PT/OT), eating well, and following care plan. Follow up for routine check-up, or sooner if needed. Will continue to monitor patient and work with nursing staff collaboratively to work toward positive patient outcomes.    Electronically signed by: Saritha  Poonam, CNP

## 2021-06-20 NOTE — LETTER
Letter by Saritha Levin CNP at      Author: Saritha Levin CNP Service: -- Author Type: --    Filed:  Encounter Date: 2020 Status: (Other)         Patient: Ortiz Ely   MR Number: [id number]   YOB: 1930   Date of Visit: 2020     [facility]    Facility:   [facility] [[id number]]   Code Status: DNR/DNI and POLST AVAILABLE      CHIEF COMPLAINT/REASON FOR VISIT:  Chief Complaint   Patient presents with   ? Review Of Multiple Medical Conditions     physical deconditioning, fall, left hip fracture       HISTORY:      HPI: Ortiz is a 89 y.o. male who  has a past medical history of Alcohol abuse, Angioedema, Cholelithiasis, Coronary stenosis, HTN (hypertension), and Prostate cancer (H) ().  Here he was recently hospitalized at [facility] from May 2 to May 10, 2020.  The discharging provider summarized the hospitalization in previous notes.     Today Ortiz is being evaluated for a routine review of multiple medical problems while in the TCU. Nursing staff express his wound care needs revision due to the fact that is soaking through his dressing a few times per day. Ortiz states he feels it is fine. He doesn't have any other concerns or issues to report. He has no concerns or issues to report. Ortiz denies any other concerns including fevers/chills, cough or cold symptoms, headaches, vision changes, chest pain/pressure, difficulty breathing, SOB, abdominal pain, nausea, vomiting, diarrhea, dysuria, increasing weakness, increasing pain.     Past Medical History:   Diagnosis Date   ? Alcohol abuse     Multiple episodes dating back to    ? Angioedema    ? Cholelithiasis    ? Coronary stenosis    ? HTN (hypertension)    ? Prostate cancer (H)     Cryotherapy             Family History   Problem Relation Age of Onset   ? Alcoholism Unknown    ? Other Mother         old age,  at 88   ? Melanoma Father      Social History     Socioeconomic  History   ? Marital status:      Spouse name: Not on file   ? Number of children: 2   ? Years of education: Not on file   ? Highest education level: Not on file   Occupational History   ? Occupation: Retired    Social Needs   ? Financial resource strain: Not on file   ? Food insecurity     Worry: Not on file     Inability: Not on file   ? Transportation needs     Medical: Not on file     Non-medical: Not on file   Tobacco Use   ? Smoking status: Former Smoker     Packs/day: 1.00     Years: 15.00     Pack years: 15.00     Last attempt to quit: 1965     Years since quittin.4   ? Smokeless tobacco: Never Used   Substance and Sexual Activity   ? Alcohol use: Yes     Comment: 2-4 drinks a day (he won't say for sure)   ? Drug use: No   ? Sexual activity: Not on file   Lifestyle   ? Physical activity     Days per week: Not on file     Minutes per session: Not on file   ? Stress: Not on file   Relationships   ? Social connections     Talks on phone: Not on file     Gets together: Not on file     Attends Yarsanism service: Not on file     Active member of club or organization: Not on file     Attends meetings of clubs or organizations: Not on file     Relationship status: Not on file   ? Intimate partner violence     Fear of current or ex partner: Not on file     Emotionally abused: Not on file     Physically abused: Not on file     Forced sexual activity: Not on file   Other Topics Concern   ? Not on file   Social History Narrative    Second Marriage, 2 children from his first marriage.  No living will but he wants to be a full code.     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    PHYSICAL EXAM:   /72   Pulse 87   Temp 97  F (36.1  C)   Resp 18   Wt 138 lb 6.4 oz (62.8 kg)   SpO2 98%   BMI 23.03 kg/m    A limited exam was  performed due to recommendations for care during COVID-19 pandemic. Due to the 2020 COVID-19 pandemic, except as noted above, the patient was visually observed at a 6 foot plus distance.  An observational exam was performed in an effort to keep patient safe from COVID-19 and other communicable diseases.     General appearance: alert, appears stated age and cooperative  HEENT: Head is normocephalic with normal hair distribution. No evidence of trauma. Ears: Without lesions or deformity. No acute purulent discharge. Eyes: Conjunctivae pink with no scleral icterus or erythema. Nose: Normal. Oropharnyx: mmm.  Lungs: respirations without effort.  Extremities: extremities normal, atraumatic, no cyanosis.  Skin: Skin color, texture normal. No rashes or lesions on exposed skin.  Bandages appear to have moderate amount of serosanguineous drainage on left hip.  Neurologic: Grossly normal   Psych: interacts well with caregivers, exhibits logical thought processes and connections, pleasant.      LABS:   None today.     ASSESSMENT:      ICD-10-CM    1. Fall, initial encounter  W19.XXXA    2. Physical deconditioning  R53.81    3. Closed comminuted intertrochanteric fracture of left femur, initial encounter (H)  S72.142A        PLAN:    Physical Deconditioning  -Continue PT/OT and other therapies as per care plan.  -Encouraged good nutrition and movement habits.   -Discussed care plan and expected course of stay.   -Continue to follow-up per routine schedule or sooner if needed.     Fall  -Please place on fall precautions and monitor patient routinely.  -Encouraged patient to ask for help with all transfers.   -Continue to assess for developing injuries and if patient reports any pain request provider follow-up.    Left Hip Fracture  -Tylenol 650 mg by mouth 4 times daily.  -Changed ASA to 81 mg by mouth twice daily. This is for prophylaxis of DVT after surgical repair.  -Oxycodone 2.5 to 5 mg by mouth every 3 hours as needed for  significant postop pain.  -Wound dressing change to: Hydrofiber dressing to wound base and cover with ABD, then wrap with kerlix daily.     Admission history and physical per MD in the next 30 days. At this time continue current care plan for all chronic medical conditions, as they are stable. Encouraged patient to engage in PT/OT for strengthening and conditioning. Encouraged patient to work closely with nursing staff to ensure any medical complaints are quickly addressed. Follow up this week or sooner if needed. Will continue to monitor patient and work with nursing staff collaboratively to work toward positive patient outcomes.    Electronically signed by: Saritha Levin CNP

## 2021-06-20 NOTE — LETTER
Letter by Nay Pascal CHW at      Author: Nay Pascal CHW Service: -- Author Type: --    Filed:  Encounter Date: 2/10/2020 Status: (Other)         February 10, 2020            Ortiz Ely  [address] [address]        Dear Ortiz,                                                                  You were recently referred to the [facility]'s Clinic Care Coordination service.  This is a service offered through your Primary Care Clinic which can help you access resources, services in regard to your health and well-being goals. The clinic Community Health Worker has placed two calls to you to discuss the nature of this service and to offer enrollment.  If you are interested in learning more about Clinic Care Coordination, please call your Primary Care Clinic's Community Health Worker, Nay, at [phone].                                                    Sincerely,                                                                              LESTER Tee                                                                                          Clinic Care Coordination                                                  [facility]

## 2021-06-20 NOTE — LETTER
Letter by Saritha Levin CNP at      Author: Saritha Levin CNP Service: -- Author Type: --    Filed:  Encounter Date: 2020 Status: (Other)         Patient: Ortiz Ely   MR Number: [id number]   YOB: 1930   Date of Visit: 2020     [facility]    Facility:   [facility] [[id number]]   Code Status: DNR/DNI and POLST AVAILABLE      CHIEF COMPLAINT/REASON FOR VISIT:  Chief Complaint   Patient presents with   ? Problem Visit     DVT       HISTORY:      HPI: Ortiz is a 89 y.o. male who  has a past medical history of Alcohol abuse, Angioedema, Cholelithiasis, Coronary stenosis, HTN (hypertension), and Prostate cancer (H) ().  Here he was recently hospitalized at [facility] from May 2 to May 10, 2020.  The discharging provider summarized the hospitalization in previous notes.     Today Ortiz is being evaluated after having an ultrasound for a DVT return with a positive finding.  Ortiz states that he is fine and he does not have any new concerns or issues to report.  He has no pains.  He states the only time his leg hurts is when he is working out in therapy.  He has good pulses in his feet.  He agrees to treatment for DVT.  This is considered a provoked DVT given recent surgical procedure.  Ortiz will be anticoagulated for a total of 90 days.  Ortiz denies any other concerns including fevers/chills, cough or cold symptoms, headaches, vision changes, chest pain/pressure, difficulty breathing, SOB, abdominal pain, nausea, vomiting, diarrhea, dysuria, increasing weakness, increasing pain.     Past Medical History:   Diagnosis Date   ? Alcohol abuse     Multiple episodes dating back to    ? Angioedema    ? Cholelithiasis    ? Coronary stenosis    ? HTN (hypertension)    ? Prostate cancer (H)     Cryotherapy             Family History   Problem Relation Age of Onset   ? Alcoholism Unknown    ? Other Mother         old age,  at 88   ?  Melanoma Father      Social History     Socioeconomic History   ? Marital status:      Spouse name: Not on file   ? Number of children: 2   ? Years of education: Not on file   ? Highest education level: Not on file   Occupational History   ? Occupation: Retired    Social Needs   ? Financial resource strain: Not on file   ? Food insecurity     Worry: Not on file     Inability: Not on file   ? Transportation needs     Medical: Not on file     Non-medical: Not on file   Tobacco Use   ? Smoking status: Former Smoker     Packs/day: 1.00     Years: 15.00     Pack years: 15.00     Last attempt to quit: 1965     Years since quittin.4   ? Smokeless tobacco: Never Used   Substance and Sexual Activity   ? Alcohol use: Yes     Comment: 2-4 drinks a day (he won't say for sure)   ? Drug use: No   ? Sexual activity: Not on file   Lifestyle   ? Physical activity     Days per week: Not on file     Minutes per session: Not on file   ? Stress: Not on file   Relationships   ? Social connections     Talks on phone: Not on file     Gets together: Not on file     Attends Christian service: Not on file     Active member of club or organization: Not on file     Attends meetings of clubs or organizations: Not on file     Relationship status: Not on file   ? Intimate partner violence     Fear of current or ex partner: Not on file     Emotionally abused: Not on file     Physically abused: Not on file     Forced sexual activity: Not on file   Other Topics Concern   ? Not on file   Social History Narrative    Second Marriage, 2 children from his first marriage.  No living will but he wants to be a full code.     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    PHYSICAL EXAM:   /66   Pulse 72   Temp 97.5  F (36.4  C)   Resp 17   Wt 128 lb (58.1 kg)   SpO2  99%   BMI 21.30 kg/m    A limited exam was performed due to recommendations for care during COVID-19 pandemic. Due to the 2020 COVID-19 pandemic, except as noted above, the patient was visually observed at a 6 foot plus distance.  An observational exam was performed in an effort to keep patient safe from COVID-19 and other communicable diseases.     General appearance: alert, appears stated age and cooperative  HEENT: Head is normocephalic with normal hair distribution. No evidence of trauma. Ears: Without lesions or deformity. No acute purulent discharge. Eyes: Conjunctivae pink with no scleral icterus or erythema. Nose: Normal. Oropharnyx: mmm.  Lungs: respirations without effort.  Extremities: extremities normal, atraumatic, no cyanosis.  Skin: Skin color, texture normal. No rashes or lesions on exposed skin.  Bandages are clean, dry and intact.    Neurologic: Grossly normal   Psych: interacts well with caregivers, exhibits logical thought processes and connections, pleasant.      LABS:   None today.     ASSESSMENT:      ICD-10-CM    1. Acute deep vein thrombosis (DVT) of proximal vein of left lower extremity (H)  I82.4Y2        PLAN:    DVT  -Eliquis 10 mg by mouth every 12 hours for 7 days.  -Then change to Eliquis 5 mg by mouth every 12 hours for a total of 90 days.  -This is considered a provoked DVT given recent surgical intervention.  He should not have lifelong anticoagulation at this time.  -Follow-up as needed and per routine.    Otherwise continue current care plan for all other chronic medical conditions, as they are stable. Encouraged patient to engage in healthy lifestyle behaviors such as engaging in social activities, exercising (PT/OT), eating well, and following care plan. Follow up for routine check-up, or sooner if needed. Will continue to monitor patient and work with nursing staff collaboratively to work toward positive patient outcomes.    Electronically signed by: Saritha Levin CNP

## 2021-06-20 NOTE — LETTER
"Letter by Saritha Levin CNP at      Author: Saritha Levin CNP Service: -- Author Type: --    Filed:  Encounter Date: 7/3/2020 Status: (Other)         Patient: Ortiz Ely   MR Number: [id number]   YOB: 1930   Date of Visit: 7/3/2020     [facility]  Video visit    Facility:   [facility] [[id number]]   Code Status: DNR/DNI and POLST AVAILABLE    Ortiz Ely is a 89 y.o. male who is being evaluated via a billable video visit.      The patient has been notified of following:     \"This video visit will be conducted via a call between you and your physician/provider. We have found that certain health care needs can be provided without the need for an in-person physical exam.  This service lets us provide the care you need with a video conversation.  If a prescription is necessary we can send it to the facility team.  If lab work is needed we can place an order through the facility team to have that test done at a later time.    If during the course of the call the physician/provider feels a video visit is not appropriate, you will not be charged for this service.\"     Physician/provider has received verbal consent for a Video Visit from the patient? Yes    Patient would like the video invitation sent by: Send to: [employer]    Video Start Time: 1032    CHIEF COMPLAINT/REASON FOR VISIT:  Chief Complaint   Patient presents with   ? Review Of Multiple Medical Conditions     Hospice, dysphagia, hip fx       HISTORY:      HPI: Ortiz is a 89 y.o. male who  has a past medical history of Alcohol abuse, Angioedema, Cholelithiasis, Coronary stenosis, HTN (hypertension), and Prostate cancer (H) (2004).  Here he was recently hospitalized at [facility] from May 2 to May 10, 2020.  The discharging provider summarized the hospitalization in previous notes.     Today Ortiz is being evaluated for ongoing issues with failure to thrive.  Kirti recently had a video swallow " study which showed significant issues with swallowing and suggested he be n.p.o.  Ortiz was given results by Dr. Wise.  He elects not to have a G-tube or feeding tube.  He states that he would like to continue to eat as he is at this time.  Did discuss hospice with him today and he does not want to use this benefit.  Did confirm with daughter Aminah and also with sister-in-law Leigh Ann who are his decision makers.  They agree that they should respect his wishes.  Lengthy discussion with both parties to ensure they understood the magnitude of the situation, prognosis and treatment plan. There were no questions.  Will provide a hospice referral to [facility] hospice.  Goal is for hospice to start on .  Ortiz states he is otherwise doing well.  He is sleepy but otherwise feels well.  Ortiz denies any other concerns including fevers/chills, cough or cold symptoms, headaches, vision changes, chest pain/pressure, difficulty breathing, SOB, abdominal pain, nausea, vomiting, diarrhea, dysuria, increasing weakness, increasing pain.     Past Medical History:   Diagnosis Date   ? Alcohol abuse     Multiple episodes dating back to    ? Angioedema    ? Cholelithiasis    ? Coronary stenosis    ? HTN (hypertension)    ? Prostate cancer (H) 2004    Cryotherapy             Family History   Problem Relation Age of Onset   ? Alcoholism Unknown    ? Other Mother         old age,  at 88   ? Melanoma Father      Social History     Socioeconomic History   ? Marital status:      Spouse name: Not on file   ? Number of children: 2   ? Years of education: Not on file   ? Highest education level: Not on file   Occupational History   ? Occupation: Retired    Social Needs   ? Financial resource strain: Not on file   ? Food insecurity     Worry: Not on file     Inability: Not on file   ? Transportation needs     Medical: Not on file     Non-medical: Not on file   Tobacco Use   ? Smoking status: Former Smoker      Packs/day: 1.00     Years: 15.00     Pack years: 15.00     Last attempt to quit: 1965     Years since quittin.5   ? Smokeless tobacco: Never Used   Substance and Sexual Activity   ? Alcohol use: Yes     Comment: 2-4 drinks a day (he won't say for sure)   ? Drug use: No   ? Sexual activity: Not on file   Lifestyle   ? Physical activity     Days per week: Not on file     Minutes per session: Not on file   ? Stress: Not on file   Relationships   ? Social connections     Talks on phone: Not on file     Gets together: Not on file     Attends Mandaen service: Not on file     Active member of club or organization: Not on file     Attends meetings of clubs or organizations: Not on file     Relationship status: Not on file   ? Intimate partner violence     Fear of current or ex partner: Not on file     Emotionally abused: Not on file     Physically abused: Not on file     Forced sexual activity: Not on file   Other Topics Concern   ? Not on file   Social History Narrative    Second Marriage, 2 children from his first marriage.  No living will but he wants to be a full code.     Ruma Reyna 08    Judi- 12/10/06    [employer] order    Daughter-Ml    Retired  [employer]    Eva kapoor        REVIEW OF SYSTEM:  Pertinent items are noted in HPI.    PHYSICAL EXAM:   /60   Pulse 82   Temp 99.6  F (37.6  C)   Resp 18   Wt 128 lb (58.1 kg)   SpO2 98%   BMI 21.30 kg/m    A limited exam was performed due to recommendations for care during COVID-19 pandemic. Due to the  COVID-19 , except as noted above, the patient was visually observed at a 6 foot plus distance.  An observational exam was performed in an effort to keep patient safe from COVID-19 and other communicable diseases.     General appearance: alert, appears stated age and cooperative  HEENT: Head is normocephalic with normal hair distribution. No evidence of trauma. Ears: Without lesions or deformity. No  "acute purulent discharge. Eyes: Conjunctivae pink with no scleral icterus or erythema. Nose: Normal. Oropharnyx: mmm.  Lungs: respirations without effort.  Extremities: extremities normal, atraumatic, no cyanosis.  Skin: Skin color, texture normal. No rashes or lesions on exposed skin.    Neurologic: Grossly normal   Psych: interacts well with caregivers, exhibits illogical thought processes and connections with evidence of dementia--doesn't understand why he is here,\"wants to go home\", pleasant.      LABS:   None today.     ASSESSMENT:      ICD-10-CM    1. Closed comminuted intertrochanteric fracture of left femur, initial encounter (H)  S72.142A    2. Other dysphagia  R13.19    3. Hospice care  Z51.5        PLAN:    Femur fracture  -Goal is comfort.  -Not necessary to follow-up with Ortho at this time given hospice enrollment.  -Acetaminophen 650 mg 4 times daily.  -Follow-up as needed.    Dysphagia  -Recent swallow study indicates he should have nothing by mouth.  -However this is not in line with patient's life goals and end-of-life care wishes.  He will not entertain a feeding tube.  Will continue to support his wishes.  -Continue to follow with hospice.    Hospice care  -Lengthy discussions with both Leigh Ann and Aminah.  Both are in agreement that hospice is what Kirti would want.  -[facility] hospice has been consulted.                                                                                                                                                                Otherwise continue current care plan for all other chronic medical conditions, as they are stable. Encouraged patient to engage in healthy lifestyle behaviors such as engaging in social activities, exercising (PT/OT), eating well, and following care plan. Follow up for routine check-up, or sooner if needed. Will continue to monitor patient and work with nursing staff collaboratively to work toward positive patient " outcomes.    Electronically signed by: Saritha Levin CNP

## 2021-06-20 NOTE — LETTER
Letter by Saritha Levin CNP at      Author: Saritha Levin CNP Service: -- Author Type: --    Filed:  Encounter Date: 6/1/2020 Status: (Other)         Patient: Ortiz Ely   MR Number: [id number]   YOB: 1930   Date of Visit: 6/1/2020     [facility]    Facility:   [facility] [[id number]]   Code Status: DNR/DNI and POLST AVAILABLE      CHIEF COMPLAINT/REASON FOR VISIT:  Chief Complaint   Patient presents with   ? Review Of Multiple Medical Conditions     physical deconditioning, fall, hip fx, confusion       HISTORY:      HPI: Ortiz is a 89 y.o. male who  has a past medical history of Alcohol abuse, Angioedema, Cholelithiasis, Coronary stenosis, HTN (hypertension), and Prostate cancer (H) (2004).  Here he was recently hospitalized at [facility] from May 2 to May 10, 2020.  The discharging provider summarized the hospitalization in previous notes.     Today Ortiz is being evaluated for a routine review of multiple medical problems. Ortiz is confused. He seems more confused than previous visits, however nursing staff believe that this is his baseline. He has had this type behavior on and off since coming to the facility. Ortiz continues to have falls or near falls. He has not been injured. He has no complaints. He states his hip hurts only every so often. Vitals have been stable. Ortiz has been working with PT/OT and feels it is going good. Appetite is poor--on remeron for sleep and to aid in appetite. He has not been eating or drinking well. Will continue to work with nursing staff and therapy staff to ensure optimal outcomes. Ortiz denies any other concerns including fevers/chills, cough or cold symptoms, headaches, vision changes, chest pain/pressure, difficulty breathing, SOB, abdominal pain, nausea, vomiting, diarrhea, dysuria, increasing weakness, increasing pain.     Past Medical History:   Diagnosis Date   ? Alcohol abuse     Multiple episodes  dating back to    ? Angioedema    ? Cholelithiasis    ? Coronary stenosis    ? HTN (hypertension)    ? Prostate cancer (H) 2004    Cryotherapy             Family History   Problem Relation Age of Onset   ? Alcoholism Unknown    ? Other Mother         old age,  at 88   ? Melanoma Father      Social History     Socioeconomic History   ? Marital status:      Spouse name: Not on file   ? Number of children: 2   ? Years of education: Not on file   ? Highest education level: Not on file   Occupational History   ? Occupation: Retired    Social Needs   ? Financial resource strain: Not on file   ? Food insecurity     Worry: Not on file     Inability: Not on file   ? Transportation needs     Medical: Not on file     Non-medical: Not on file   Tobacco Use   ? Smoking status: Former Smoker     Packs/day: 1.00     Years: 15.00     Pack years: 15.00     Last attempt to quit: 1965     Years since quittin.4   ? Smokeless tobacco: Never Used   Substance and Sexual Activity   ? Alcohol use: Yes     Comment: 2-4 drinks a day (he won't say for sure)   ? Drug use: No   ? Sexual activity: Not on file   Lifestyle   ? Physical activity     Days per week: Not on file     Minutes per session: Not on file   ? Stress: Not on file   Relationships   ? Social connections     Talks on phone: Not on file     Gets together: Not on file     Attends Rastafarian service: Not on file     Active member of club or organization: Not on file     Attends meetings of clubs or organizations: Not on file     Relationship status: Not on file   ? Intimate partner violence     Fear of current or ex partner: Not on file     Emotionally abused: Not on file     Physically abused: Not on file     Forced sexual activity: Not on file   Other Topics Concern   ? Not on file   Social History Narrative    Second Marriage, 2 children from his first marriage.  No living will but he wants to be a full code.     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    PHYSICAL EXAM:   /70   Pulse 94   Temp 98.9  F (37.2  C)   Resp 18   Wt 129 lb 3.2 oz (58.6 kg)   SpO2 98%   BMI 21.50 kg/m    A limited exam was performed due to recommendations for care during COVID-19 pandemic. Due to the  COVID-19 , except as noted above, the patient was visually observed at a 6 foot plus distance.  An observational exam was performed in an effort to keep patient safe from COVID-19 and other communicable diseases.     General appearance: alert, appears stated age and cooperative  HEENT: Head is normocephalic with normal hair distribution. No evidence of trauma. Ears: Without lesions or deformity. No acute purulent discharge. Eyes: Conjunctivae pink with no scleral icterus or erythema. Nose: Normal. Oropharnyx: mmm.  Lungs: respirations without effort.  Extremities: extremities normal, atraumatic, no cyanosis.  Skin: Skin color, texture normal. No rashes or lesions on exposed skin.    Neurologic: Grossly normal   Psych: interacts well with caregivers, exhibits logical thought processes and connections, pleasant.      LABS:   None today.     ASSESSMENT:      ICD-10-CM    1. Confusion  R41.0    2. Closed comminuted intertrochanteric fracture of left femur, initial encounter (H)  S72.142A    3. Physical deconditioning  R53.81    4. Acute deep vein thrombosis (DVT) of proximal vein of left lower extremity (H)  I82.4Y2        PLAN:    DVT  -Eliquis 10 mg by mouth every 12 hours for 7 days.  -Then change to Eliquis 5 mg by mouth every 12 hours for a total of 90 days.  -This is considered a provoked DVT given recent surgical intervention.  He should not have lifelong anticoagulation at this time.  -Follow-up as needed and per routine.    Confusion  -CBC, BMP.  -Encourage fluids.  -This appears that  this may be his baseline.  -May want to discontinue oxycodone.     Physical Deconditioning  -Continue PT/OT and other therapies as per care plan.  -Encouraged good nutrition and movement habits.   -Discussed care plan and expected course of stay.   -Continue to follow-up per routine schedule or sooner if needed.     Fall  -Please place on fall precautions and monitor patient routinely.  -Encouraged patient to ask for help with all transfers.   -Continue to assess for developing injuries and if patient reports any pain request provider follow-up.    Left Hip Fracture  -Tylenol 650 mg by mouth 4 times daily.  -Changed ASA to 81 mg by mouth twice daily. This is for prophylaxis of DVT after surgical repair.  -Oxycodone 2.5 to 5 mg by mouth every 3 hours as needed for significant postop pain.  -Wound dressing change to: Hydrofiber dressing to wound base and cover with ABD, then wrap with kerlix daily.   -Follow with Ortho.    Otherwise continue current care plan for all other chronic medical conditions, as they are stable. Encouraged patient to engage in healthy lifestyle behaviors such as engaging in social activities, exercising (PT/OT), eating well, and following care plan. Follow up for routine check-up, or sooner if needed. Will continue to monitor patient and work with nursing staff collaboratively to work toward positive patient outcomes.    Electronically signed by: Saritha Levin CNP

## 2021-06-20 NOTE — LETTER
Letter by Saritha Levin CNP at      Author: Saritha Levin CNP Service: -- Author Type: --    Filed:  Encounter Date: 7/6/2020 Status: (Other)         Patient: Ortiz Ely   MR Number: [id number]   YOB: 1930   Date of Visit: 7/6/2020     [facility]    Facility:   [facility] [[id number]]   Code Status: DNR/DNI and POLST AVAILABLE    CHIEF COMPLAINT/REASON FOR VISIT:  Chief Complaint   Patient presents with   ? Follow Up     Hospice       HISTORY:      HPI: Ortiz is a 89 y.o. male who  has a past medical history of Alcohol abuse, Angioedema, Cholelithiasis, Coronary stenosis, HTN (hypertension), and Prostate cancer (H) (2004).  Here he was recently hospitalized at [facility] from May 2 to May 10, 2020.  The discharging provider summarized the hospitalization in previous notes.     Today Ortiz is being evaluated for a follow-up to recent hospice discussion and hospice election.  Ortiz recently elected hospice services rather than treatment with a feeding tube for significant dysphasia.  He continues to want to eat and drink per his norm.  Ortiz would like to focus on comfort and quality of life.  He does not want any further surgeries or any further diagnostic studies or tests.  This was confirmed with his sister-in-law Leigh Ann and his daughter Aminah.  However, there was some apparent confusion about the order and processing the order.  It appears that hospice has not yet been involved unfortunately.  A new order was written after confirming with Ortiz that he would continue in this fashion.  No other concerns or issues at this time.  Again goals for end-of-life care include comfort and pain control.    Past Medical History:   Diagnosis Date   ? Alcohol abuse     Multiple episodes dating back to 2001   ? Angioedema    ? Cholelithiasis    ? Coronary stenosis    ? HTN (hypertension)    ? Prostate cancer (H) 2004    Cryotherapy             Family History    Problem Relation Age of Onset   ? Alcoholism Unknown    ? Other Mother         old age,  at 88   ? Melanoma Father      Social History     Socioeconomic History   ? Marital status:      Spouse name: Not on file   ? Number of children: 2   ? Years of education: Not on file   ? Highest education level: Not on file   Occupational History   ? Occupation: Retired    Social Needs   ? Financial resource strain: Not on file   ? Food insecurity     Worry: Not on file     Inability: Not on file   ? Transportation needs     Medical: Not on file     Non-medical: Not on file   Tobacco Use   ? Smoking status: Former Smoker     Packs/day: 1.00     Years: 15.00     Pack years: 15.00     Last attempt to quit: 1965     Years since quittin.5   ? Smokeless tobacco: Never Used   Substance and Sexual Activity   ? Alcohol use: Yes     Comment: 2-4 drinks a day (he won't say for sure)   ? Drug use: No   ? Sexual activity: Not on file   Lifestyle   ? Physical activity     Days per week: Not on file     Minutes per session: Not on file   ? Stress: Not on file   Relationships   ? Social connections     Talks on phone: Not on file     Gets together: Not on file     Attends Yazdanism service: Not on file     Active member of club or organization: Not on file     Attends meetings of clubs or organizations: Not on file     Relationship status: Not on file   ? Intimate partner violence     Fear of current or ex partner: Not on file     Emotionally abused: Not on file     Physically abused: Not on file     Forced sexual activity: Not on file   Other Topics Concern   ? Not on file   Social History Narrative    Second Marriage, 2 children from his first marriage.  No living will but he wants to be a full code.     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    PHYSICAL EXAM:   /60   Pulse 82   Temp 99.6  F (37.6  C)   Resp 18   Wt 128 lb (58.1 kg)   SpO2 98%   BMI 21.30 kg/m    A limited exam was performed due to recommendations for care during COVID-19 pandemic. Due to the 2020 COVID-19 pandemic, except as noted above, the patient was visually observed at a 6 foot plus distance.  An observational exam was performed in an effort to keep patient safe from COVID-19 and other communicable diseases.     General appearance: alert, appears stated age and cooperative  HEENT: Head is normocephalic with normal hair distribution. No evidence of trauma. Ears: Without lesions or deformity. No acute purulent discharge. Eyes: Conjunctivae pink with no scleral icterus or erythema. Nose: Normal. Oropharnyx: mmm.  Lungs: respirations without effort.  Extremities: extremities normal, atraumatic, no cyanosis.  Skin: Skin color, texture normal. No rashes or lesions on exposed skin.    Neurologic: Grossly normal   Psych: interacts well with caregivers, exhibits illogical thought processes and connections with evidence of dementia--doesn't understand why he is here,\"wants to go home\", pleasant.      LABS:   None today.     ASSESSMENT:      ICD-10-CM    1. Hospice care  Z51.5        PLAN:      Hospice care  -A new order for [facility] hospice has been written.  -Follow-up as needed.                                                                                                                                                                Otherwise continue current care plan for all other chronic medical conditions, as they are stable. Encouraged patient to engage in healthy lifestyle behaviors such as engaging in social activities, exercising or movement, eating well, and following care plan. Follow up for routine check-up, or sooner if needed. Will continue to monitor patient and work with nursing staff collaboratively to work toward positive patient " outcomes.    Electronically signed by: Saritha Levin CNP

## 2021-06-20 NOTE — LETTER
"Letter by Saritha Levin CNP at      Author: Saritha Levin CNP Service: -- Author Type: --    Filed:  Encounter Date: 6/26/2020 Status: (Other)         Patient: Ortiz Ely   MR Number: [id number]   YOB: 1930   Date of Visit: 6/26/2020     [facility]  Video visit    Facility:   [facility] [[id number]]   Code Status: DNR/DNI and POLST AVAILABLE    Ortiz Ely is a 89 y.o. male who is being evaluated via a billable video visit.      The patient has been notified of following:     \"This video visit will be conducted via a call between you and your physician/provider. We have found that certain health care needs can be provided without the need for an in-person physical exam.  This service lets us provide the care you need with a video conversation.  If a prescription is necessary we can send it to the facility team.  If lab work is needed we can place an order through the facility team to have that test done at a later time.    If during the course of the call the physician/provider feels a video visit is not appropriate, you will not be charged for this service.\"     Physician/provider has received verbal consent for a Video Visit from the patient? Yes    Patient would like the video invitation sent by: Send to: [employer]    Video Start Time: 1032    CHIEF COMPLAINT/REASON FOR VISIT:  Chief Complaint   Patient presents with   ? Problem Visit     Failure to thrive, hospice       HISTORY:      HPI: Ortiz is a 89 y.o. male who  has a past medical history of Alcohol abuse, Angioedema, Cholelithiasis, Coronary stenosis, HTN (hypertension), and Prostate cancer (H) (2004).  Here he was recently hospitalized at [facility] from May 2 to May 10, 2020.  The discharging provider summarized the hospitalization in previous notes.     Today Ortiz is being evaluated for ongoing issues with failure to thrive.  Kirti recently had a video swallow study which showed " significant issues with swallowing and suggested he be n.p.o.  Ortiz was given results by Dr. Wise.  He elects not to have a G-tube or feeding tube.  He states that he would like to continue to eat as he is at this time.  Did discuss hospice with him today and he does not want to use this benefit.  Did confirm with daughter Aminah and also with sister-in-law Leigh Ann who are his decision makers.  They agree that they should respect his wishes.  Lengthy discussion with both parties to ensure they understood the magnitude of the situation, prognosis and treatment plan. There were no questions.  Will provide a hospice referral to [facility] hospice.  Goal is for hospice to start on .  Ortiz states he is otherwise doing well.  He is sleepy but otherwise feels well.  Ortiz denies any other concerns including fevers/chills, cough or cold symptoms, headaches, vision changes, chest pain/pressure, difficulty breathing, SOB, abdominal pain, nausea, vomiting, diarrhea, dysuria, increasing weakness, increasing pain.     Past Medical History:   Diagnosis Date   ? Alcohol abuse     Multiple episodes dating back to    ? Angioedema    ? Cholelithiasis    ? Coronary stenosis    ? HTN (hypertension)    ? Prostate cancer (H) 2004    Cryotherapy             Family History   Problem Relation Age of Onset   ? Alcoholism Unknown    ? Other Mother         old age,  at 88   ? Melanoma Father      Social History     Socioeconomic History   ? Marital status:      Spouse name: Not on file   ? Number of children: 2   ? Years of education: Not on file   ? Highest education level: Not on file   Occupational History   ? Occupation: Retired    Social Needs   ? Financial resource strain: Not on file   ? Food insecurity     Worry: Not on file     Inability: Not on file   ? Transportation needs     Medical: Not on file     Non-medical: Not on file   Tobacco Use   ? Smoking status: Former Smoker     Packs/day: 1.00      Years: 15.00     Pack years: 15.00     Last attempt to quit: 1965     Years since quittin.5   ? Smokeless tobacco: Never Used   Substance and Sexual Activity   ? Alcohol use: Yes     Comment: 2-4 drinks a day (he won't say for sure)   ? Drug use: No   ? Sexual activity: Not on file   Lifestyle   ? Physical activity     Days per week: Not on file     Minutes per session: Not on file   ? Stress: Not on file   Relationships   ? Social connections     Talks on phone: Not on file     Gets together: Not on file     Attends [employer] service: Not on file     Active member of club or organization: Not on file     Attends meetings of clubs or organizations: Not on file     Relationship status: Not on file   ? Intimate partner violence     Fear of current or ex partner: Not on file     Emotionally abused: Not on file     Physically abused: Not on file     Forced sexual activity: Not on file   Other Topics Concern   ? Not on file   Social History Narrative    Second Marriage, 2 children from his first marriage.  No living will but he wants to be a full code.     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    PHYSICAL EXAM:   /60   Pulse 82   Temp 99.6  F (37.6  C)   Resp 18   Wt 132 lb (59.9 kg)   SpO2 98%   BMI 21.97 kg/m    A limited exam was performed due to recommendations for care during COVID-19 pandemic. Due to the  COVID-19 pandemic, except as noted above, the patient was visually observed at a 6 foot plus distance.  An observational exam was performed in an effort to keep patient safe from COVID-19 and other communicable diseases.     General appearance: alert, appears stated age and cooperative  HEENT: Head is normocephalic with normal hair distribution. No evidence of trauma. Ears: Without lesions or deformity. No acute purulent  "discharge. Eyes: Conjunctivae pink with no scleral icterus or erythema. Nose: Normal. Oropharnyx: mmm.  Lungs: respirations without effort.  Extremities: extremities normal, atraumatic, no cyanosis.  Skin: Skin color, texture normal. No rashes or lesions on exposed skin.    Neurologic: Grossly normal   Psych: interacts well with caregivers, exhibits illogical thought processes and connections with evidence of dementia--doesn't understand why he is here,\"wants to go home\", pleasant.      LABS:   None today.     ASSESSMENT:      ICD-10-CM    1. Adult failure to thrive  R62.7    2. Advanced care planning/counseling discussion  Z71.89        PLAN:    Adult failure to thrive  -Hospice referral to [facility].  -Seroquel 25 mg by mouth in the morning and 50 mg at bedtime.  -Continue to offer supplementation and meals.  -Follow-up as needed.                                                                                                                                                              Otherwise continue current care plan for all other chronic medical conditions, as they are stable. Encouraged patient to engage in healthy lifestyle behaviors such as engaging in social activities, exercising (PT/OT), eating well, and following care plan. Follow up for routine check-up, or sooner if needed. Will continue to monitor patient and work with nursing staff collaboratively to work toward positive patient outcomes.    Video-Visit Details    Type of service:  Video Visit    Video End Time (time video stopped): 1026    Originating Location (pt. Location):[facility] [[id number]]    Distant Location (provider location):  [facility]     Mode of Communication:  FaceTime    Electronically signed by: Saritha Levin CNP         "

## 2021-06-20 NOTE — LETTER
Letter by Saritha Levin CNP at      Author: Saritha Levin CNP Service: -- Author Type: --    Filed:  Encounter Date: 6/16/2020 Status: (Other)         Patient: Ortiz Ely   MR Number: [id number]   YOB: 1930   Date of Visit: 6/16/2020     1. 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     Discussion surrounded Ortiz's failure to thrive without indication of infection or other issue. Ortiz has not been doing well with therapies, he has not been eating, he has lost a great deal of weight, is very depressed, has had several falls, has worsening confusion, worsening weakness. All of this information was relayed to Leigh Ann. She states she was aware that he was declining but didn't quite understand the clinical picture. We discussed a swallow study that is set up for tomorrow. We also discussed the possibility of hospice support and following a hospice route of care and philosophy.     The plan is that at this time is to have Ortiz undergo the video swallow study and then discuss findings. Leigh Ann is going to speak with Ortiz's daughter Aminah (whose health is quite fragile at this time). They will determine next steps and how aggressive they would like to be. At this time it seems that they would not want to do anything aggressive.     We do want to ensure that Ortiz is comfortable and not suffering psychologically. He does appear to continue to have issues with PTSD. Remeron does not appear to be helping. Oxycodone was discontinued due to confusion.     New orders called to nursing staff:   -Tylenol 650 mg by mouth four times a day.   -Change amlodipine to 10 mg by mouth at bedtime.   -Discontinue melatonin, remeron.   -Seroquel was changed to 25 mg by mouth in the morning and 50 mg by mouth at bedtime for PTSD related psychosis.     Will follow  up with Leigh Ann when results are available of swallow study.     Total time of 40 minutes time consisted of the following: time spent on the phone with Leigh Ann, reviewing the record including pertinent labs, discussing with nursing staff.     Saritha Levin CNP

## 2021-06-20 NOTE — LETTER
Letter by Saritha Levin CNP at      Author: Saritha Levin CNP Service: -- Author Type: --    Filed:  Encounter Date: 5/26/2020 Status: (Other)         Patient: Ortiz Ely   MR Number: [id number]   YOB: 1930   Date of Visit: 5/26/2020     [facility]    Facility:   [facility] [[id number]]   Code Status: DNR/DNI and POLST AVAILABLE      CHIEF COMPLAINT/REASON FOR VISIT:  Chief Complaint   Patient presents with   ? Review Of Multiple Medical Conditions     Physical deconditioning, fall, left hip fracture, PTSD       HISTORY:      HPI: Ortiz is a 89 y.o. male who  has a past medical history of Alcohol abuse, Angioedema, Cholelithiasis, Coronary stenosis, HTN (hypertension), and Prostate cancer (H) (2004).  Here he was recently hospitalized at [facility] from May 2 to May 10, 2020.  The discharging provider summarized the hospitalization in previous notes.     Today Ortiz is being evaluated for a routine review for medical problems while in the [facility].  Ortiz states that he has been doing well and has no new issues or concerns.  Nursing staff feel that he has been doing well with the prazosin.  He has had no any falls or PTSD episodes.  Ortiz states that physical and occupational therapies are going well.  He states that he has been eating, drinking and eliminating well.  He initially had a great deal of serosanguineous drainage from his left hip and that seems to be improving.  Ortiz denies any other concerns including fevers/chills, cough or cold symptoms, headaches, vision changes, chest pain/pressure, difficulty breathing, SOB, abdominal pain, nausea, vomiting, diarrhea, dysuria, increasing weakness, increasing pain.     Past Medical History:   Diagnosis Date   ? Alcohol abuse     Multiple episodes dating back to 2001   ? Angioedema    ? Cholelithiasis    ? Coronary stenosis    ? HTN (hypertension)    ? Prostate cancer (H) 2004    Cryotherapy              Family History   Problem Relation Age of Onset   ? Alcoholism Unknown    ? Other Mother         old age,  at 88   ? Melanoma Father      Social History     Socioeconomic History   ? Marital status:      Spouse name: Not on file   ? Number of children: 2   ? Years of education: Not on file   ? Highest education level: Not on file   Occupational History   ? Occupation: Retired    Social Needs   ? Financial resource strain: Not on file   ? Food insecurity     Worry: Not on file     Inability: Not on file   ? Transportation needs     Medical: Not on file     Non-medical: Not on file   Tobacco Use   ? Smoking status: Former Smoker     Packs/day: 1.00     Years: 15.00     Pack years: 15.00     Last attempt to quit: 1965     Years since quittin.4   ? Smokeless tobacco: Never Used   Substance and Sexual Activity   ? Alcohol use: Yes     Comment: 2-4 drinks a day (he won't say for sure)   ? Drug use: No   ? Sexual activity: Not on file   Lifestyle   ? Physical activity     Days per week: Not on file     Minutes per session: Not on file   ? Stress: Not on file   Relationships   ? Social connections     Talks on phone: Not on file     Gets together: Not on file     Attends Jewish service: Not on file     Active member of club or organization: Not on file     Attends meetings of clubs or organizations: Not on file     Relationship status: Not on file   ? Intimate partner violence     Fear of current or ex partner: Not on file     Emotionally abused: Not on file     Physically abused: Not on file     Forced sexual activity: Not on file   Other Topics Concern   ? Not on file   Social History Narrative    Second Marriage, 2 children from his first marriage.  No living will but he wants to be a full code.     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    PHYSICAL EXAM:   /72   Pulse 75   Temp 97.1  F (36.2  C)   Resp 16   Wt 136 lb 6.4 oz (61.9 kg)   SpO2 97%   BMI 22.70 kg/m    A limited exam was performed due to recommendations for care during COVID-19 pandemic. Due to the 2020 COVID-19 pandemic, except as noted above, the patient was visually observed at a 6 foot plus distance.  An observational exam was performed in an effort to keep patient safe from COVID-19 and other communicable diseases.     General appearance: alert, appears stated age and cooperative  HEENT: Head is normocephalic with normal hair distribution. No evidence of trauma. Ears: Without lesions or deformity. No acute purulent discharge. Eyes: Conjunctivae pink with no scleral icterus or erythema. Nose: Normal. Oropharnyx: mmm.  Lungs: respirations without effort.  Extremities: extremities normal, atraumatic, no cyanosis.  Skin: Skin color, texture normal. No rashes or lesions on exposed skin.  Bandages are clean, dry and intact.    Neurologic: Grossly normal   Psych: interacts well with caregivers, exhibits logical thought processes and connections, pleasant.      LABS:   None today.     ASSESSMENT:      ICD-10-CM    1. Hip fracture requiring operative repair, left, closed, initial encounter (H)  S72.002A    2. Physical deconditioning  R53.81    3. PTSD (post-traumatic stress disorder)  F43.10    4. Fall, initial encounter  W19.XXXA        PLAN:    Physical Deconditioning  -Continue PT/OT and other therapies as per care plan.  -Encouraged good nutrition and movement habits.   -Discussed care plan and expected course of stay.   -Continue to follow-up per routine schedule or sooner if needed.     Fall  -Please place on fall precautions and monitor patient routinely.  -Encouraged patient to ask for help with all transfers.   -Continue to assess for developing injuries and if patient reports any pain request provider follow-up.    PTSD  -Prazosin 2 mg by mouth at  bedtime.  -Refuses psych eval and treat at this time, will continue to encourage and discuss psych eval and treat.   -Did discuss with nursing staff that he feels much better at night with the tears if someone simply holds his hand for a few minutes.  -Follow up as needed and per routine.     Left Hip Fracture  -Tylenol 650 mg by mouth 4 times daily.  -Changed ASA to 81 mg by mouth twice daily. This is for prophylaxis of DVT after surgical repair.  -Oxycodone 2.5 to 5 mg by mouth every 3 hours as needed for significant postop pain.  -Wound dressing change to: Hydrofiber dressing to wound base and cover with ABD, then wrap with kerlix daily.   -Follow with Ortho.    Otherwise continue current care plan for all other chronic medical conditions, as they are stable. Encouraged patient to engage in healthy lifestyle behaviors such as engaging in social activities, exercising (PT/OT), eating well, and following care plan. Follow up for routine check-up, or sooner if needed. Will continue to monitor patient and work with nursing staff collaboratively to work toward positive patient outcomes.    Electronically signed by: Saritha Levin CNP
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
## 2021-06-20 NOTE — LETTER
Letter by Saritha Levin CNP at      Author: Saritha Levin CNP Service: -- Author Type: --    Filed:  Encounter Date: 6/8/2020 Status: (Other)         Patient: Ortiz Ely   MR Number: [id number]   YOB: 1930   Date of Visit: 6/8/2020     [facility]    Facility:   [facility] [[id number]]   Code Status: DNR/DNI and POLST AVAILABLE      CHIEF COMPLAINT/REASON FOR VISIT:  Chief Complaint   Patient presents with   ? Problem Visit     AFTT, Confusion       HISTORY:      HPI: Ortiz is a 89 y.o. male who  has a past medical history of Alcohol abuse, Angioedema, Cholelithiasis, Coronary stenosis, HTN (hypertension), and Prostate cancer (H) (2004).  Here he was recently hospitalized at [facility] from May 2 to May 10, 2020.  The discharging provider summarized the hospitalization in previous notes.     Today Ortiz is being evaluated for a follow-up to his recent issues with failure to thrive and also to discuss moving forward. Ortiz has no new issues or problems to report, however nursing staff have been concerned about ongoing confusion, lethargy and also appetite has been declining. Ortiz is very vague. He has nothing to add. Just states he wants to go home. Did go over his recent health with him and he is not comprehending the issues affecting his health. Ortiz has continued to work with PT/OT. Ortiz denies any other concerns including fevers/chills, cough or cold symptoms, headaches, vision changes, chest pain/pressure, difficulty breathing, SOB, abdominal pain, nausea, vomiting, diarrhea, dysuria, increasing weakness, increasing pain.     Past Medical History:   Diagnosis Date   ? Alcohol abuse     Multiple episodes dating back to 2001   ? Angioedema    ? Cholelithiasis    ? Coronary stenosis    ? HTN (hypertension)    ? Prostate cancer (H) 2004    Cryotherapy             Family History   Problem Relation Age of Onset   ? Alcoholism Unknown    ? Other  Mother         old age,  at 88   ? Melanoma Father      Social History     Socioeconomic History   ? Marital status:      Spouse name: Not on file   ? Number of children: 2   ? Years of education: Not on file   ? Highest education level: Not on file   Occupational History   ? Occupation: Retired    Social Needs   ? Financial resource strain: Not on file   ? Food insecurity     Worry: Not on file     Inability: Not on file   ? Transportation needs     Medical: Not on file     Non-medical: Not on file   Tobacco Use   ? Smoking status: Former Smoker     Packs/day: 1.00     Years: 15.00     Pack years: 15.00     Last attempt to quit: 1965     Years since quittin.4   ? Smokeless tobacco: Never Used   Substance and Sexual Activity   ? Alcohol use: Yes     Comment: 2-4 drinks a day (he won't say for sure)   ? Drug use: No   ? Sexual activity: Not on file   Lifestyle   ? Physical activity     Days per week: Not on file     Minutes per session: Not on file   ? Stress: Not on file   Relationships   ? Social connections     Talks on phone: Not on file     Gets together: Not on file     Attends [employer] service: Not on file     Active member of club or organization: Not on file     Attends meetings of clubs or organizations: Not on file     Relationship status: Not on file   ? Intimate partner violence     Fear of current or ex partner: Not on file     Emotionally abused: Not on file     Physically abused: Not on file     Forced sexual activity: Not on file   Other Topics Concern   ? Not on file   Social History Narrative    Second Marriage, 2 children from his first marriage.  No living will but he wants to be a full code.     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    PHYSICAL EXAM:   /70   Pulse 90   Temp 97.6  F (36.4  C)    Resp 18   Wt 124 lb 6.4 oz (56.4 kg)   SpO2 100%   BMI 20.70 kg/m    A limited exam was performed due to recommendations for care during COVID-19 pandemic. Due to the 2020 COVID-19 pandemic, except as noted above, the patient was visually observed at a 6 foot plus distance.  An observational exam was performed in an effort to keep patient safe from COVID-19 and other communicable diseases.     General appearance: alert, appears stated age and cooperative  HEENT: Head is normocephalic with normal hair distribution. No evidence of trauma. Ears: Without lesions or deformity. No acute purulent discharge. Eyes: Conjunctivae pink with no scleral icterus or erythema. Nose: Normal. Oropharnyx: mmm.  Lungs: respirations without effort.  Extremities: extremities normal, atraumatic, no cyanosis.  Skin: Skin color, texture normal. No rashes or lesions on exposed skin.    Neurologic: Grossly normal   Psych: interacts well with caregivers, exhibits logical thought processes and connections with evidence of dementia, pleasant.      LABS:   None today.     ASSESSMENT:      ICD-10-CM    1. Confusion  R41.0    2. Adult failure to thrive  R62.7        PLAN:    Confusion, AFTT  -CBC, BMP reordered to follow ongoing issues and confusion.   -Encourage fluids.  -SLP to eval and treat.   -Oxycodone was discontinued.   -Unsure as to what is causing decline at this time. Ortiz is quite vague and states that he is fine, reports no problems.   -Care conference requested with family.   -This appears that this may be his baseline.  -Encourage nutrition, encourage supplementation.                                                                                                                                                                                                                                            Otherwise continue current care plan for all other chronic medical conditions, as they are stable. Encouraged patient to engage in  healthy lifestyle behaviors such as engaging in social activities, exercising (PT/OT), eating well, and following care plan. Follow up for routine check-up, or sooner if needed. Will continue to monitor patient and work with nursing staff collaboratively to work toward positive patient outcomes.    Electronically signed by: Saritha Levin CNP
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
## 2021-06-20 NOTE — LETTER
"Letter by Saritha Levin CNP at      Author: Saritha Levin CNP Service: -- Author Type: --    Filed:  Encounter Date: 5/13/2020 Status: (Other)         Patient: Ortiz Ely   MR Number: [id number]   YOB: 1930   Date of Visit: 5/13/2020     [facility]    Facility:   [facility] [[id number]]   Code Status: DNR/DNI and POLST AVAILABLE      CHIEF COMPLAINT/REASON FOR VISIT:  Chief Complaint   Patient presents with   ? Review Of Multiple Medical Conditions     Physical deconditioning, fall, left hip fracture       HISTORY:      HPI: Ortiz is a 89 y.o. male who  has a past medical history of Alcohol abuse, Angioedema, Cholelithiasis, Coronary stenosis, HTN (hypertension), and Prostate cancer (H) (2004).  Here he was recently hospitalized at [facility] from May 2 to May 10, 2020.  The discharging provider summarized the hospitalization as follows:    \"Mr. Ortiz Ely is a 89-year-old male with history of CKD, hypertension, history of prostate cancer status post TURP was admitted to [facility] after sustaining a fall.  Work-up revealed left intertrochanteric femoral fracture and was found to have significant anemia.  Patient was admitted and evaluated by orthopedic, underwent open reduction and internal fixation.  He was noted to have mild drainage at the site of surgery and hence his DVT prophylaxis were held per orthopedic recommendations.  Patient was noted to have drop in his hemoglobin needing transfusion preop and postop.   Hemoglobin stable, hemoglobin 8.9 on the day of discharge.  I personally discussed with on-call orthopedic on 5/9, Dr. Simmons and was recommended to resume his aspirin 325 mg p.o. twice daily for DVT prophylaxis.      Patient was also diagnosed with UTI and hospital-acquired pneumonia, was started on Levaquin.  Given his history of CKD, Levaquin was dosed based on renal functions.  Was hypoxic initially which resolved prior " "to the discharge. Pt is requesting for home portable o2 but doesn't qualify for this. Noted pt to have urinary retention needing multiple straight caths.  Patient was evaluated by urology given his history of prostate cancer.  Urology recommends patient to continue straight cath if bladder scan shows urine more than 300.  If patient needing frequent straight caths and self-catheterization will need to follow-up with urology as outpatient.  Patient's daughter, Aminah was called on the day of discharge and updated on the plan.  All questions were answered to the best of my ability.  Patient has been stable and doing well.  Patient stable to be discharged to skilled nursing facility today.  Please refer to discharge nurse and instructions for more details.\"    Today Ortiz is being evaluated for an intake into the TCU.  He states that he is doing quite well.  However nursing staff have called regarding the amount of drainage coming out of his left hip incisions.  They do not appear infected at this time.  They are clean without erythema.  There is a mild wound of drainage but this appears to be serosanguineous drainage.  Ortiz states that he has been eating, drinking and eliminating well.  He has no concerns or issues to report. Ortiz denies any other concerns including fevers/chills, cough or cold symptoms, headaches, vision changes, chest pain/pressure, difficulty breathing, SOB, abdominal pain, nausea, vomiting, diarrhea, dysuria, increasing weakness, increasing pain.     Advanced Care Planning  Spoke with Ortiz regarding code status and advanced care planning.  Ortiz consented to discussion and is aware of possible copay. They are also aware of the necessity of this discussion due to TCU admission. Discussed that he would NOT like to have full resuscitation efforts. However, he would also like to have treatment if he  were to fall ill. he would like some medical treatment for certain medical issues. His sister in " lawEugenia Joel [phone]) would decide for her/him if he  was unable to make medical decisions. he agrees to IV/IM antibiotics. he does not want a feeding tube. There are no Confucianism obligations he  would like documented at this time.     Past Medical History:   Diagnosis Date   ? Alcohol abuse     Multiple episodes dating back to    ? Angioedema    ? Cholelithiasis    ? Coronary stenosis    ? HTN (hypertension)    ? Prostate cancer (H) 2004    Cryotherapy             Family History   Problem Relation Age of Onset   ? Alcoholism Unknown    ? Other Mother         old age,  at 88   ? Melanoma Father      Social History     Socioeconomic History   ? Marital status:      Spouse name: Not on file   ? Number of children: 2   ? Years of education: Not on file   ? Highest education level: Not on file   Occupational History   ? Occupation: Retired    Social Needs   ? Financial resource strain: Not on file   ? Food insecurity     Worry: Not on file     Inability: Not on file   ? Transportation needs     Medical: Not on file     Non-medical: Not on file   Tobacco Use   ? Smoking status: Former Smoker     Packs/day: 1.00     Years: 15.00     Pack years: 15.00     Last attempt to quit: 1965     Years since quittin.4   ? Smokeless tobacco: Never Used   Substance and Sexual Activity   ? Alcohol use: Yes     Comment: 2-4 drinks a day (he won't say for sure)   ? Drug use: No   ? Sexual activity: Not on file   Lifestyle   ? Physical activity     Days per week: Not on file     Minutes per session: Not on file   ? Stress: Not on file   Relationships   ? Social connections     Talks on phone: Not on file     Gets together: Not on file     Attends Confucianism service: Not on file     Active member of club or organization: Not on file     Attends meetings of clubs or organizations: Not on file     Relationship status: Not on file   ? Intimate partner violence     Fear of current or ex partner: Not on  file     Emotionally abused: Not on file     Physically abused: Not on file     Forced sexual activity: Not on file   Other Topics Concern   ? Not on file   Social History Narrative    Second Marriage, 2 children from his first marriage.  No living will but he wants to be a full code.     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    PHYSICAL EXAM:   /59   Pulse 77   Temp 97.8  F (36.6  C)   Resp 18   Wt 145 lb 8 oz (66 kg)   SpO2 100%   BMI 24.21 kg/m    A limited exam was performed due to recommendations for care during COVID-19 pandemic. Due to the  COVID-19 pandemic, except as noted above, the patient was visually observed at a 6 foot plus distance.  An observational exam was performed in an effort to keep patient safe from COVID-19 and other communicable diseases.     General appearance: alert, appears stated age and cooperative  HEENT: Head is normocephalic with normal hair distribution. No evidence of trauma. Ears: Without lesions or deformity. No acute purulent discharge. Eyes: Conjunctivae pink with no scleral icterus or erythema. Nose: Normal. Oropharnyx: mmm.  Lungs: respirations without effort.  Extremities: extremities normal, atraumatic, no cyanosis.  Skin: Skin color, texture normal. No rashes or lesions on exposed skin.  Bandages appear to have moderate amount of serosanguineous drainage on left hip.  Neurologic: Grossly normal   Psych: interacts well with caregivers, exhibits logical thought processes and connections, pleasant.      LABS:   None today.     ASSESSMENT:      ICD-10-CM    1. Fall, subsequent encounter  W19.XXXD    2. Hip fracture requiring operative repair, left, closed, initial encounter (H)  S72.002A    3. Physical deconditioning  R53.81    4. Advanced care planning/counseling discussion  Z71.89        PLAN:     Physical Deconditioning  -Continue PT/OT and other therapies as per care plan.  -Encouraged good nutrition and movement habits.   -Discussed care plan and expected course of stay.   -Continue to follow-up per routine schedule or sooner if needed.     Fall  -Please place on fall precautions and monitor patient routinely.  -Encouraged patient to ask for help with all transfers.   -Continue to assess for developing injuries and if patient reports any pain request provider follow-up.    Left Hip Fracture  -Tylenol 650 mg by mouth 4 times daily.  - mg by mouth twice daily, this should be changed to 81 mg by mouth daily due to risk of GI bleed and efficacy.  This is for prophylaxis of DVT after surgical repair.  -Oxycodone 2.5 to 5 mg by mouth every 3 hours as needed for significant postop pain.    Advanced Care Planning  -POLST reviewed and signed.   -DNR/DNI.     Admission history and physical per MD in the next 30 days. At this time continue current care plan for all chronic medical conditions, as they are stable. Encouraged patient to engage in PT/OT for strengthening and conditioning. Encouraged patient to work closely with nursing staff to ensure any medical complaints are quickly addressed. Follow up this week or sooner if needed. Will continue to monitor patient and work with nursing staff collaboratively to work toward positive patient outcomes.    Total unit/floor time of 35 minutes time consisted of the following: time spent with patient, examination of patient, reviewing the record including pertinent labs and completing documentation. More than 50% of this time was spent in coordination of care time with nursing staff and other healthcare providers, this time was spent on discussion/counseling on current care plan including medical management of chronic health problems and acute health problems, education pertaining to plan, and discussion of the goals of care pertaining to the current outlined plan  with nursing staff and patient. An additional 16 minutes of time was spent discussing code status, wishes for end of life care and reviewing POLST from 0900 to 0916. POLST signed and left with nursing staff.     Electronically signed by: Saritha Levin CNP

## 2021-06-20 NOTE — LETTER
Letter by Saritha Levin CNP at      Author: Saritha Levin CNP Service: -- Author Type: --    Filed:  Encounter Date: 6/17/2020 Status: (Other)         Patient: Ortiz Ely   MR Number: [id number]   YOB: 1930   Date of Visit: 6/17/2020     [facility]    Facility:   [facility] [[id number]]   Code Status: DNR/DNI and POLST AVAILABLE      CHIEF COMPLAINT/REASON FOR VISIT:  Chief Complaint   Patient presents with   ? Problem Visit     Adult failure to thrive       HISTORY:      HPI: Ortiz is a 89 y.o. male who  has a past medical history of Alcohol abuse, Angioedema, Cholelithiasis, Coronary stenosis, HTN (hypertension), and Prostate cancer (H) (2004).  Here he was recently hospitalized at [facility] from May 2 to May 10, 2020.  The discharging provider summarized the hospitalization in previous notes.     Today Ortiz is being evaluated for ongoing issues with failure to thrive. Ortiz does have a swallow study today. We are awaiting results of the study to determine next steps in care. Ortiz shares he has been fine. However, per previous visits this is not true. He has lost a significant amount of weight. Did discuss goals of care with Ortiz. He states he just wants to go home. This discussion was had with his sister-in-law Leigh Ann too. Given his current state this is just not feasible or safe. Ortiz is to be admitted to LTC given his poor therapy progression. Will continue to work with Ortiz and family to ascertain what is best for him and his wishes. Nursing staff did report that he had significant improvement last night with the use of seroquel. He had no falls, was not shouting or hollering out. He seemed to be in much better control per nursing staff. Ortiz denies any other concerns including fevers/chills, cough or cold symptoms, headaches, vision changes, chest pain/pressure, difficulty breathing, SOB, abdominal pain, nausea, vomiting, diarrhea,  dysuria, increasing weakness, increasing pain.     Past Medical History:   Diagnosis Date   ? Alcohol abuse     Multiple episodes dating back to    ? Angioedema    ? Cholelithiasis    ? Coronary stenosis    ? HTN (hypertension)    ? Prostate cancer (H) 2004    Cryotherapy             Family History   Problem Relation Age of Onset   ? Alcoholism Unknown    ? Other Mother         old age,  at 88   ? Melanoma Father      Social History     Socioeconomic History   ? Marital status:      Spouse name: Not on file   ? Number of children: 2   ? Years of education: Not on file   ? Highest education level: Not on file   Occupational History   ? Occupation: Retired    Social Needs   ? Financial resource strain: Not on file   ? Food insecurity     Worry: Not on file     Inability: Not on file   ? Transportation needs     Medical: Not on file     Non-medical: Not on file   Tobacco Use   ? Smoking status: Former Smoker     Packs/day: 1.00     Years: 15.00     Pack years: 15.00     Last attempt to quit: 1965     Years since quittin.5   ? Smokeless tobacco: Never Used   Substance and Sexual Activity   ? Alcohol use: Yes     Comment: 2-4 drinks a day (he won't say for sure)   ? Drug use: No   ? Sexual activity: Not on file   Lifestyle   ? Physical activity     Days per week: Not on file     Minutes per session: Not on file   ? Stress: Not on file   Relationships   ? Social connections     Talks on phone: Not on file     Gets together: Not on file     Attends Nondenominational service: Not on file     Active member of club or organization: Not on file     Attends meetings of clubs or organizations: Not on file     Relationship status: Not on file   ? Intimate partner violence     Fear of current or ex partner: Not on file     Emotionally abused: Not on file     Physically abused: Not on file     Forced sexual activity: Not on file   Other Topics Concern   ? Not on file   Social History Narrative    Second  "Marriage, 2 children from his first marriage.  No living will but he wants to be a full code.     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    PHYSICAL EXAM:   /76   Pulse 82   Temp 99.6  F (37.6  C)   Resp 18   Wt 124 lb 6.4 oz (56.4 kg)   SpO2 98%   BMI 20.70 kg/m    A limited exam was performed due to recommendations for care during COVID-19 pandemic. Due to the  COVID-19 pandemic, except as noted above, the patient was visually observed at a 6 foot plus distance.  An observational exam was performed in an effort to keep patient safe from COVID-19 and other communicable diseases.     General appearance: alert, appears stated age and cooperative  HEENT: Head is normocephalic with normal hair distribution. No evidence of trauma. Ears: Without lesions or deformity. No acute purulent discharge. Eyes: Conjunctivae pink with no scleral icterus or erythema. Nose: Normal. Oropharnyx: mmm.  Lungs: respirations without effort.  Extremities: extremities normal, atraumatic, no cyanosis.  Skin: Skin color, texture normal. No rashes or lesions on exposed skin.    Neurologic: Grossly normal   Psych: interacts well with caregivers, exhibits illogical thought processes and connections with evidence of dementia--doesn't understand why he is here,\"wants to go home\", pleasant.      LABS:   None today.     ASSESSMENT:      ICD-10-CM    1. Adult failure to thrive  R62.7        PLAN:    Adult failure to thrive, agitation  -Continue current care plan that was initiated yesterday.  -Seroquel 25 mg by mouth in the morning and 50 mg at bedtime.  -Continue to offer supplementation and meals.  -Swallow study to be done today.  Follow-up with results of study.   -Plan to determine next steps off of information obtained from swallow study.   -Plan to follow closely in " the coming days.                                                                                                                                                               Otherwise continue current care plan for all other chronic medical conditions, as they are stable. Encouraged patient to engage in healthy lifestyle behaviors such as engaging in social activities, exercising (PT/OT), eating well, and following care plan. Follow up for routine check-up, or sooner if needed. Will continue to monitor patient and work with nursing staff collaboratively to work toward positive patient outcomes.    Electronically signed by: Saritha Levin CNP
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
## 2021-06-20 NOTE — LETTER
Letter by Saritha Levin CNP at      Author: Sartiha Levin CNP Service: -- Author Type: --    Filed:  Encounter Date: 6/3/2020 Status: (Other)         Patient: Ortiz Ely   MR Number: [id number]   YOB: 1930   Date of Visit: 6/3/2020     [facility]    Facility:   [facility] [[id number]]   Code Status: DNR/DNI and POLST AVAILABLE      CHIEF COMPLAINT/REASON FOR VISIT:  Chief Complaint   Patient presents with   ? Follow Up     Falls, confusion       HISTORY:      HPI: Ortiz is a 89 y.o. male who  has a past medical history of Alcohol abuse, Angioedema, Cholelithiasis, Coronary stenosis, HTN (hypertension), and Prostate cancer (H) (2004).  Here he was recently hospitalized at [facility] from May 2 to May 10, 2020.  The discharging provider summarized the hospitalization in previous notes.     Today Ortiz is being evaluated for a follow-up to recent falls and confusion.  Ortiz states that he has been fine.  He does not recall any recent falls.  He was on Monday about prison out of bed and nearly falling.  He states that he is not quite sure what is wrong with him.  We did discuss his labs in detail.  Also discussed falling and risk of injury while on blood thinners.  Ortiz does not seem to fully comprehend and has not for quite some time.  He has had good days and bad days since being with us.  It appears that he is significantly worse at night.  Ortiz does not have any complaints or issues to report.  Nursing staff continue to just report confusion and falls.  May be worth doing a UTI, however he would be essentially asymptomatic and may be difficult to tell whether this is asymptomatic bacteriuria or if it was truly a UTI.  Will defer at this time and determine with family what they prefer.  Ortiz denies any other concerns including fevers/chills, cough or cold symptoms, headaches, vision changes, chest pain/pressure, difficulty breathing, SOB,  abdominal pain, nausea, vomiting, diarrhea, dysuria, increasing weakness, increasing pain.     Past Medical History:   Diagnosis Date   ? Alcohol abuse     Multiple episodes dating back to    ? Angioedema    ? Cholelithiasis    ? Coronary stenosis    ? HTN (hypertension)    ? Prostate cancer (H) 2004    Cryotherapy             Family History   Problem Relation Age of Onset   ? Alcoholism Unknown    ? Other Mother         old age,  at 88   ? Melanoma Father      Social History     Socioeconomic History   ? Marital status:      Spouse name: Not on file   ? Number of children: 2   ? Years of education: Not on file   ? Highest education level: Not on file   Occupational History   ? Occupation: Retired    Social Needs   ? Financial resource strain: Not on file   ? Food insecurity     Worry: Not on file     Inability: Not on file   ? Transportation needs     Medical: Not on file     Non-medical: Not on file   Tobacco Use   ? Smoking status: Former Smoker     Packs/day: 1.00     Years: 15.00     Pack years: 15.00     Last attempt to quit: 1965     Years since quittin.4   ? Smokeless tobacco: Never Used   Substance and Sexual Activity   ? Alcohol use: Yes     Comment: 2-4 drinks a day (he won't say for sure)   ? Drug use: No   ? Sexual activity: Not on file   Lifestyle   ? Physical activity     Days per week: Not on file     Minutes per session: Not on file   ? Stress: Not on file   Relationships   ? Social connections     Talks on phone: Not on file     Gets together: Not on file     Attends Taoist service: Not on file     Active member of club or organization: Not on file     Attends meetings of clubs or organizations: Not on file     Relationship status: Not on file   ? Intimate partner violence     Fear of current or ex partner: Not on file     Emotionally abused: Not on file     Physically abused: Not on file     Forced sexual activity: Not on file   Other Topics Concern   ? Not on  file   Social History Narrative    Second Marriage, 2 children from his first marriage.  No living will but he wants to be a full code.     Ruma Reyna 08    Judi- 12/10/06    [employer] order    Daughter-Ml    Retired [employer]    [place] kapoor        REVIEW OF SYSTEM:  Pertinent items are noted in HPI.    PHYSICAL EXAM:   /74   Pulse 64   Temp 99.2  F (37.3  C)   Resp 18   Wt 129 lb 3.2 oz (58.6 kg)   SpO2 97%   BMI 21.50 kg/m    A limited exam was performed due to recommendations for care during COVID-19 pandemic. Due to the  COVID-19 pandemic, except as noted above, the patient was visually observed at a 6 foot plus distance.  An observational exam was performed in an effort to keep patient safe from COVID-19 and other communicable diseases.     General appearance: alert, appears stated age and cooperative  HEENT: Head is normocephalic with normal hair distribution. No evidence of trauma. Ears: Without lesions or deformity. No acute purulent discharge. Eyes: Conjunctivae pink with no scleral icterus or erythema. Nose: Normal. Oropharnyx: mmm.  Lungs: respirations without effort.  Extremities: extremities normal, atraumatic, no cyanosis.  Skin: Skin color, texture normal. No rashes or lesions on exposed skin.    Neurologic: Grossly normal   Psych: interacts well with caregivers, exhibits logical thought processes and connections with evidence of dementia, pleasant.      LABS:   None today.     ASSESSMENT:      ICD-10-CM    1. Fall, initial encounter  W19.XXXA    2. Confusion  R41.0        PLAN:    Confusion  -CBC, BMP that were done earlier in the week were appropriate and without significant issue or change.  -Encourage fluids.  -This appears that this may be his baseline.    Fall  -Please place on fall precautions and monitor patient routinely.  -Encouraged patient to ask for help with all transfers.   -Continue to assess for developing injuries and if  patient reports any pain request provider follow-up.    Otherwise continue current care plan for all other chronic medical conditions, as they are stable. Encouraged patient to engage in healthy lifestyle behaviors such as engaging in social activities, exercising (PT/OT), eating well, and following care plan. Follow up for routine check-up, or sooner if needed. Will continue to monitor patient and work with nursing staff collaboratively to work toward positive patient outcomes.    Electronically signed by: Saritha Levin CNP

## 2021-06-24 NOTE — TELEPHONE ENCOUNTER
Refill Approved    Rx renewed per Medication Renewal Policy. Medication was last renewed on 12/9/18.    Leigh Ann Coppola, [facility] Triage/Med Refill 3/1/2019     Requested Prescriptions   Pending Prescriptions Disp Refills     hydroCHLOROthiazide (HYDRODIURIL) 25 MG tablet [Pharmacy Med Name: HYDROCHLOROTHIAZIDE  25MG  TAB] 90 tablet 3     Sig: TAKE 1 TABLET BY MOUTH  DAILY    Diuretics/Combination Diuretics Refill Protocol  Passed - 3/1/2019 12:56 PM       Passed - Visit with PCP or prescribing provider visit in past 12 months    Last office visit with prescriber/PCP: 8/8/2018 Preston London MD OR same dept: 8/8/2018 Preston London MD OR same specialty: 8/8/2018 Preston London MD  Last physical: Visit date not found Last MTM visit: Visit date not found   Next visit within 3 mo: Visit date not found  Next physical within 3 mo: Visit date not found  Prescriber OR PCP: Preston London MD  Last diagnosis associated with med order: 1. Essential hypertension  - hydroCHLOROthiazide (HYDRODIURIL) 25 MG tablet [Pharmacy Med Name: HYDROCHLOROTHIAZIDE  25MG  TAB]; Take 1 tablet by mouth  daily  Dispense: 90 tablet; Refill: 3    If protocol passes may refill for 12 months if within 3 months of last provider visit (or a total of 15 months).            Passed - Serum Potassium in past 12 months     Lab Results   Component Value Date    Potassium 4.8 08/08/2018            Passed - Serum Sodium in past 12 months     Lab Results   Component Value Date    Sodium 141 08/08/2018            Passed - Blood pressure on file in past 12 months    BP Readings from Last 1 Encounters:   08/08/18 126/70            Passed - Serum Creatinine in past 12 months     Creatinine   Date Value Ref Range Status   08/08/2018 2.00 (H) 0.70 - 1.30 mg/dL Final

## 2021-07-14 PROBLEM — O22.30 DVT (DEEP VEIN THROMBOSIS) IN PREGNANCY: Status: RESOLVED | Noted: 2020-01-01 | Resolved: 2020-01-01

## 2024-03-28 NOTE — PROGRESS NOTES
1. 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     Discussion surrounded Ortiz's failure to thrive without indication of infection or other issue. Ortiz has not been doing well with therapies, he has not been eating, he has lost a great deal of weight, is very depressed, has had several falls, has worsening confusion, worsening weakness. All of this information was relayed to Leigh Ann. She states she was aware that he was declining but didn't quite understand the clinical picture. We discussed a swallow study that is set up for tomorrow. We also discussed the possibility of hospice support and following a hospice route of care and philosophy.     The plan is that at this time is to have Ortiz undergo the video swallow study and then discuss findings. Leigh Ann is going to speak with Ortiz's daughter Aminah (whose health is quite fragile at this time). They will determine next steps and how aggressive they would like to be. At this time it seems that they would not want to do anything aggressive.     We do want to ensure that Ortiz is comfortable and not suffering psychologically. He does appear to continue to have issues with PTSD. Remeron does not appear to be helping. Oxycodone was discontinued due to confusion.     New orders called to nursing staff:   -Tylenol 650 mg by mouth four times a day.   -Change amlodipine to 10 mg by mouth at bedtime.   -Discontinue melatonin, remeron.   -Seroquel was changed to 25 mg by mouth in the morning and 50 mg by mouth at bedtime for PTSD related psychosis.     Will follow up with Leigh Ann when results are available of swallow study.     Total time of 40 minutes time consisted of the following: time spent on the phone with Leigh Ann, reviewing the record including pertinent labs, discussing with nursing staff.     Saritha Levin,  CNP     PAST SURGICAL HISTORY:  No significant past surgical history

## 2024-11-01 NOTE — LETTER
Letter by Preston London MD at      Author: Preston London MD Service: -- Author Type: --    Filed:  Encounter Date: 2/27/2020 Status: (Other)         Ortiz Ely  [address]  [facility] [address]             February 27, 2020         Dear Mr. Ely,    Below are the results from your recent visit:    Resulted Orders   Renal Function Profile   Result Value Ref Range    Albumin 4.0 3.5 - 5.0 g/dL    Calcium 9.7 8.5 - 10.5 mg/dL    Phosphorus 3.7 2.5 - 4.5 mg/dL    Glucose 109 70 - 125 mg/dL    BUN 37 (H) 8 - 28 mg/dL    Creatinine 1.90 (H) 0.70 - 1.30 mg/dL    Sodium 140 136 - 145 mmol/L    Potassium 5.2 (H) 3.5 - 5.0 mmol/L    Chloride 110 (H) 98 - 107 mmol/L    CO2 19 (L) 22 - 31 mmol/L    Anion Gap, Calculation 11 5 - 18 mmol/L    GFR MDRD Af Amer 41 (L) >60 mL/min/1.73m2    GFR MDRD Non Af Amer 34 (L) >60 mL/min/1.73m2    Narrative    Fasting Glucose reference range is 70-99 mg/dL per  American Diabetes Association (ADA) guidelines.       I enjoyed seeing you in the office at your appointment.  I am pleased with the results of your lab tests.    Kidney function is stable      Please call with questions or contact us using [employer].    Sincerely,        Electronically signed by Preston London MD        no